# Patient Record
Sex: FEMALE | Race: BLACK OR AFRICAN AMERICAN | Employment: OTHER | URBAN - METROPOLITAN AREA
[De-identification: names, ages, dates, MRNs, and addresses within clinical notes are randomized per-mention and may not be internally consistent; named-entity substitution may affect disease eponyms.]

---

## 2020-03-18 ENCOUNTER — HOSPITAL ENCOUNTER (OUTPATIENT)
Dept: GENERAL RADIOLOGY | Age: 70
End: 2020-03-18
Attending: SURGERY
Payer: MEDICARE

## 2020-03-18 ENCOUNTER — HOSPITAL ENCOUNTER (OUTPATIENT)
Dept: GENERAL RADIOLOGY | Age: 70
Discharge: HOME | End: 2020-03-18
Attending: SURGERY
Payer: MEDICARE

## 2020-03-18 ENCOUNTER — HOSPITAL ENCOUNTER (OUTPATIENT)
Dept: PREADMISSION TESTING | Age: 70
Discharge: HOME | End: 2020-03-18
Payer: MEDICARE

## 2020-03-18 VITALS
HEIGHT: 62 IN | HEART RATE: 71 BPM | RESPIRATION RATE: 18 BRPM | BODY MASS INDEX: 29.89 KG/M2 | WEIGHT: 162.44 LBS | OXYGEN SATURATION: 98 % | DIASTOLIC BLOOD PRESSURE: 89 MMHG | SYSTOLIC BLOOD PRESSURE: 189 MMHG | TEMPERATURE: 98.6 F

## 2020-03-18 LAB
ABO + RH BLD: NORMAL
ANION GAP SERPL CALC-SCNC: 4 MMOL/L (ref 5–15)
ATRIAL RATE: 73 BPM
BASOPHILS # BLD: 0.1 K/UL (ref 0–0.1)
BASOPHILS NFR BLD: 1 % (ref 0–1)
BLOOD GROUP ANTIBODIES SERPL: NORMAL
BUN SERPL-MCNC: 8 MG/DL (ref 6–20)
BUN/CREAT SERPL: 14 (ref 12–20)
CALCIUM SERPL-MCNC: 8.9 MG/DL (ref 8.5–10.1)
CALCULATED P AXIS, ECG09: 62 DEGREES
CALCULATED R AXIS, ECG10: 40 DEGREES
CALCULATED T AXIS, ECG11: 126 DEGREES
CHLORIDE SERPL-SCNC: 105 MMOL/L (ref 97–108)
CO2 SERPL-SCNC: 29 MMOL/L (ref 21–32)
CREAT SERPL-MCNC: 0.58 MG/DL (ref 0.55–1.02)
DIAGNOSIS, 93000: NORMAL
DIFFERENTIAL METHOD BLD: ABNORMAL
EOSINOPHIL # BLD: 0.1 K/UL (ref 0–0.4)
EOSINOPHIL NFR BLD: 1 % (ref 0–7)
ERYTHROCYTE [DISTWIDTH] IN BLOOD BY AUTOMATED COUNT: 18.2 % (ref 11.5–14.5)
GLUCOSE SERPL-MCNC: 156 MG/DL (ref 65–100)
HCT VFR BLD AUTO: 29.3 % (ref 35–47)
HGB BLD-MCNC: 8.1 G/DL (ref 11.5–16)
IMM GRANULOCYTES # BLD AUTO: 0 K/UL (ref 0–0.04)
IMM GRANULOCYTES NFR BLD AUTO: 0 % (ref 0–0.5)
LYMPHOCYTES # BLD: 1.6 K/UL (ref 0.8–3.5)
LYMPHOCYTES NFR BLD: 21 % (ref 12–49)
MCH RBC QN AUTO: 18.8 PG (ref 26–34)
MCHC RBC AUTO-ENTMCNC: 27.6 G/DL (ref 30–36.5)
MCV RBC AUTO: 68 FL (ref 80–99)
MONOCYTES # BLD: 0.6 K/UL (ref 0–1)
MONOCYTES NFR BLD: 8 % (ref 5–13)
NEUTS SEG # BLD: 5.4 K/UL (ref 1.8–8)
NEUTS SEG NFR BLD: 69 % (ref 32–75)
NRBC # BLD: 0 K/UL (ref 0–0.01)
NRBC BLD-RTO: 0 PER 100 WBC
P-R INTERVAL, ECG05: 142 MS
PLATELET # BLD AUTO: 303 K/UL (ref 150–400)
PMV BLD AUTO: 8.7 FL (ref 8.9–12.9)
POTASSIUM SERPL-SCNC: 4 MMOL/L (ref 3.5–5.1)
Q-T INTERVAL, ECG07: 416 MS
QRS DURATION, ECG06: 82 MS
QTC CALCULATION (BEZET), ECG08: 458 MS
RBC # BLD AUTO: 4.31 M/UL (ref 3.8–5.2)
RBC MORPH BLD: ABNORMAL
RBC MORPH BLD: ABNORMAL
SODIUM SERPL-SCNC: 138 MMOL/L (ref 136–145)
SPECIMEN EXP DATE BLD: NORMAL
VENTRICULAR RATE, ECG03: 73 BPM
WBC # BLD AUTO: 7.8 K/UL (ref 3.6–11)

## 2020-03-18 PROCEDURE — 71046 X-RAY EXAM CHEST 2 VIEWS: CPT

## 2020-03-18 PROCEDURE — 80048 BASIC METABOLIC PNL TOTAL CA: CPT

## 2020-03-18 PROCEDURE — 93005 ELECTROCARDIOGRAM TRACING: CPT

## 2020-03-18 PROCEDURE — 86900 BLOOD TYPING SEROLOGIC ABO: CPT

## 2020-03-18 PROCEDURE — 85025 COMPLETE CBC W/AUTO DIFF WBC: CPT

## 2020-03-18 PROCEDURE — 36415 COLL VENOUS BLD VENIPUNCTURE: CPT

## 2020-03-18 RX ORDER — HYDROCHLOROTHIAZIDE 25 MG/1
25 TABLET ORAL DAILY
COMMUNITY

## 2020-03-18 RX ORDER — AMLODIPINE AND BENAZEPRIL HYDROCHLORIDE 5; 20 MG/1; MG/1
1 CAPSULE ORAL DAILY
COMMUNITY

## 2020-03-18 RX ORDER — SITAGLIPTIN AND METFORMIN HYDROCHLORIDE 100; 1000 MG/1; MG/1
1 TABLET, FILM COATED, EXTENDED RELEASE ORAL EVERY EVENING
COMMUNITY

## 2020-03-18 NOTE — PERIOP NOTES
[address] [facility] [address], [address] [facility]   MAIN OR                                  [phone]   MAIN PRE OP                          [phone]                                                                                AMBULATORY PRE OP          [phone]  PRE-ADMISSION TESTING    [phone]   Surgery Date:   3/24/2020        Is surgery arrival time given by surgeon? NO  If NO, [address] staff will call you between 3 and 7pm the day before your surgery with your arrival time. (If your surgery is on a Monday, we will call you the Friday before.)    Call [phone] after 7pm Monday-Friday if you did not receive this call. INSTRUCTIONS BEFORE YOUR SURGERY   When You  Arrive Arrive at the 2nd [address] [facility] on the day of your surgery  Have your insurance card, photo ID, and any copayment (if needed)   Food   and   Drink NO food or drink after midnight the night before surgery    This means NO water, gum, mints, coffee, juice, etc.  No alcohol (beer, wine, liquor) 24 hours before and after surgery   Medications to   TAKE   Morning of Surgery MEDICATIONS TO TAKE THE MORNING OF SURGERY WITH A SIP OF WATER:    N/A   Medications  To  STOP      7 days before surgery  Non-Steroidal anti-inflammatory Drugs (NSAID's): for example, Ibuprofen (Advil, Motrin), Naproxen (Aleve)   Aspirin, if taking for pain    Herbal supplements, vitamins, and fish oil   Other:  (Pain medications not listed above, including Tylenol may be taken)   Blood  Thinners  If you take  Aspirin, Plavix, Coumadin, or any blood-thinning or anti-blood clot medicine, talk to the doctor who prescribed the medications for pre-operative instructions.    Bathing Clothing  Jewelry  Valuables      If you shower the morning of surgery, please do not apply anything to your skin (lotions, powders, deodorant, or makeup, especially mascara)   Follow Chlorhexidine Care Fusion body wash instructions provided to you during PAT appointment. Begin 3 days prior to surgery.  Do not shave or trim anywhere 24 hours before surgery   Wear your hair loose or down; no pony-tails, buns, or metal hair clips   Wear loose, comfortable, clean clothes   Wear glasses instead of contacts   Leave money, valuables, and jewelry, including body piercings, at home   Going Home - or Spending the Night  SAME-DAY SURGERY: You must have a responsible adult drive you home and stay with you 24 hours after surgery   ADMITS: If your doctor is keeping you in the hospital after surgery, leave personal belongings/luggage in your car until you have a hospital room number. Hospital discharge time is 12 noon  Drivers must be here before 12 noon unless you are told differently   Special Instructions Bring completed PTA medication list with you on the day of your surgery. Incentive Spirometer per instructions. Follow all instructions so your surgery wont be cancelled. Please, be on time. If a situation occurs and you are delayed the day of surgery, call [phone]. If your physical condition changes (like a fever, cold, flu, etc.) call your surgeon. Home medication(s) reviewed and verified by bottle during PAT appointment. The patient was contacted  in person. The patient verbalizes understanding of all instructions and does not  need reinforcement.

## 2020-05-19 ENCOUNTER — HOSPITAL ENCOUNTER (OUTPATIENT)
Dept: PREADMISSION TESTING | Age: 70
Discharge: HOME | End: 2020-05-19
Payer: MEDICARE

## 2020-05-19 VITALS
HEIGHT: 62 IN | RESPIRATION RATE: 18 BRPM | DIASTOLIC BLOOD PRESSURE: 79 MMHG | SYSTOLIC BLOOD PRESSURE: 163 MMHG | TEMPERATURE: 97.6 F | OXYGEN SATURATION: 98 % | HEART RATE: 86 BPM | BODY MASS INDEX: 30.73 KG/M2 | WEIGHT: 167 LBS

## 2020-05-19 LAB
ABO + RH BLD: NORMAL
ANION GAP SERPL CALC-SCNC: 6 MMOL/L (ref 5–15)
BASOPHILS # BLD: 0.1 K/UL (ref 0–0.1)
BASOPHILS NFR BLD: 1 % (ref 0–1)
BLOOD GROUP ANTIBODIES SERPL: NORMAL
BUN SERPL-MCNC: 16 MG/DL (ref 6–20)
BUN/CREAT SERPL: 22 (ref 12–20)
CALCIUM SERPL-MCNC: 9 MG/DL (ref 8.5–10.1)
CHLORIDE SERPL-SCNC: 103 MMOL/L (ref 97–108)
CO2 SERPL-SCNC: 27 MMOL/L (ref 21–32)
CREAT SERPL-MCNC: 0.72 MG/DL (ref 0.55–1.02)
DIFFERENTIAL METHOD BLD: ABNORMAL
EOSINOPHIL # BLD: 0.1 K/UL (ref 0–0.4)
EOSINOPHIL NFR BLD: 1 % (ref 0–7)
ERYTHROCYTE [DISTWIDTH] IN BLOOD BY AUTOMATED COUNT: 20.6 % (ref 11.5–14.5)
GLUCOSE SERPL-MCNC: 262 MG/DL (ref 65–100)
HCT VFR BLD AUTO: 31.5 % (ref 35–47)
HGB BLD-MCNC: 8.9 G/DL (ref 11.5–16)
IMM GRANULOCYTES # BLD AUTO: 0 K/UL (ref 0–0.04)
IMM GRANULOCYTES NFR BLD AUTO: 0 % (ref 0–0.5)
LYMPHOCYTES # BLD: 1.3 K/UL (ref 0.8–3.5)
LYMPHOCYTES NFR BLD: 22 % (ref 12–49)
MCH RBC QN AUTO: 19.8 PG (ref 26–34)
MCHC RBC AUTO-ENTMCNC: 28.3 G/DL (ref 30–36.5)
MCV RBC AUTO: 70 FL (ref 80–99)
MONOCYTES # BLD: 0.5 K/UL (ref 0–1)
MONOCYTES NFR BLD: 9 % (ref 5–13)
NEUTS SEG # BLD: 3.9 K/UL (ref 1.8–8)
NEUTS SEG NFR BLD: 67 % (ref 32–75)
NRBC # BLD: 0 K/UL (ref 0–0.01)
NRBC BLD-RTO: 0 PER 100 WBC
PLATELET # BLD AUTO: 379 K/UL (ref 150–400)
PMV BLD AUTO: 9.3 FL (ref 8.9–12.9)
POTASSIUM SERPL-SCNC: 4 MMOL/L (ref 3.5–5.1)
RBC # BLD AUTO: 4.5 M/UL (ref 3.8–5.2)
RBC MORPH BLD: ABNORMAL
SODIUM SERPL-SCNC: 136 MMOL/L (ref 136–145)
SPECIMEN EXP DATE BLD: NORMAL
WBC # BLD AUTO: 5.9 K/UL (ref 3.6–11)

## 2020-05-19 PROCEDURE — 36415 COLL VENOUS BLD VENIPUNCTURE: CPT

## 2020-05-19 PROCEDURE — 86900 BLOOD TYPING SEROLOGIC ABO: CPT

## 2020-05-19 PROCEDURE — 80048 BASIC METABOLIC PNL TOTAL CA: CPT

## 2020-05-19 PROCEDURE — 85025 COMPLETE CBC W/AUTO DIFF WBC: CPT

## 2020-05-19 NOTE — PERIOP NOTES
Anesthesia, , states for pt to take amlodipine/Benazepril combo DOS. Passed info along to pt's PAT RN.

## 2020-05-19 NOTE — H&P
Preoperative Evaluation                     History and Physical with Surgical Risk Stratification     5/19/2020    CC: Hernia  Surgery: Hiatal hernia repair with endoscopy     HPI:   Mo Lenz is a 78 y.o. female referred for pre-operative evaluation by Dr. Ion Myrick for surgery on 5/26/20. Mrs. Enio Gonzalez was here in March for her surgery but it had to be postponed r/t COVID-19. She was referred to the surgeon by her GI doctor earlier this year r/t her hernia. It has caused an increase in dyspepsia. Denies trouble swallowing. Denies abdominal pain. The patient was evaluated in the surgeon's office and it was determined that the most appropriate plan of care is to proceed with surgical intervention. Patient's PCP Chari Rg MD    Review of Systems     Constitutional: Negative for chills and fever  HENT: Negative for congestion and sore throat  Eyes: negative for blurred vision and double vision  Respiratory: Negative for cough, shortness of breath and wheezing  Mouth: Negative for loose, broken or chipped teeth. Cardiovascular: Negative for chest pain and palpitations  Gastrointestinal: Negative for abdominal pain, constipation, diarrhea and nausea  Genitourinary: Negative for dysuria and hematuria  Musculoskeletal:Negative for joint pain  Skin: Negative for rash, open wounds. Negative for bruises easily  Neurological: Negative for dizziness, tremors and headaches  Psychiatric: Negative for depression. The patient is not nervous/anxious.     Inherent Risk of Surgery     Surgical risk:  Intermediate  Low:  EIntermediate:   abdominal,High:    Patient Cardiac Risk Assessment     Revised Cardiac Risk Index (RCRI)    Rate if cardiac death, nonfatal MI, nonfatal cardiac arrest by number of risk factor- 0.4%    TORSTEN/AHA 2007 Guidelines:   1) Surgery Emergency, Non-cardiac -> to surgery  2) If not, look at clinical predictors    Major Intermediate Minor   Abnormal EKGDiabetes    Blood Thinner: NA    METS      EQUAL TO 4 Care for self Walk indoors around house Walk 2-3 blocks on level ground (2-3 mph) Light work around house (dust, dishes)     Other Risk Factors:   Screening for ETOH use:  Done and low risk  Smoking status:       Personal or FH of bleeding problems:  No  Personal or FH of blood clots:  No  Personal or FH of anesthesia problems:   No    Pulmonary Risk:  Asthma or COPD:  No  Body mass index is 30.54 kg/m². Known OBDULIO:  No  Albumin normal, BUN normal    Past Medical, Surgical, Social History     Allergies: No Known Allergies    Medication Documentation Review Audit     Reviewed by Maria D Krishna RN (Registered Nurse) on 05/19/20 at 26    Medication Sig Documenting Provider Last Dose Status Taking? amLODIPine-benazepril (LOTREL) 5-20 mg per capsule Take 1 Cap by mouth daily. Provider, Historical  Active Yes   cyanocobalamin, vitamin B-12, (VITAMIN B-12 PO) Take 1 Tab by mouth daily. Provider, Historical  Active    hydroCHLOROthiazide (HYDRODIURIL) 25 mg tablet Take 25 mg by mouth daily. Provider, Historical  Active Yes   SITagliptin-metFORMIN (Janumet XR) 100-1,000 mg TM24 Take 1 Tab by mouth every evening.  Provider, Historical  Active Yes                Past Medical History:   Diagnosis Date    Anemia 05/19/2020    8.9    Diabetes ([address]     GERD (gastroesophageal reflux disease)     Hypertension     Vertigo 02/2020    White coat syndrome with hypertension      Past Surgical History:   Procedure Laterality Date    HX APPENDECTOMY      HX COLONOSCOPY  05/2019    HX HERNIA REPAIR      HX LITHOTRIPSY  02/2020    HX OOPHORECTOMY Right      Social History     Tobacco Use    Smoking status: Never Smoker    Smokeless tobacco: Never Used   Substance Use Topics    Alcohol use: Never     Frequency: Never    Drug use: Never     Family History   Problem Relation Age of Onset    Deep Vein Thrombosis Neg Hx     Anesth Problems Neg Hx        Objective     Vitals:    05/19/20 1015   BP: 163/79   Pulse: 86   Resp: 18   Temp: 97.6 °F (36.4 °C)   SpO2: 98%   Weight: 75.8 kg (167 lb)   Height: 5' 2\" (1.575 m)       Constitutional:  Appears well,  No Acute Distress, Vitals noted  Psychiatric:   Affect normal, Alert and Oriented to person/place/time    Eyes:   Pupils equally round and reactive, EOMI, conjunctiva clear, eyelids normal  ENT:   External ears and nose normal, teeth normal, gums normal, TMs and Orophyarynx normal  Neck:   General inspection and Thyroid normal.  No abnormal cervical or supraclavicular nodes    Lungs:   Clear to auscultation, good respiratory effort  Heart: Ausculation normal.  Regular rhythm. No cardiac murmurs.   No carotid bruits or palpable thrills  Chest wall normal  Musculoskeletal: Gait normal  Extremities:   Without edema, good peripheral pulses  Skin:   Warm to palpation, without rashes, bruising, or suspicious lesions     Recent Results (from the past 72 hour(s))   METABOLIC PANEL, BASIC    Collection Time: 05/19/20 10:56 AM   Result Value Ref Range    Sodium 136 136 - 145 mmol/L    Potassium 4.0 3.5 - 5.1 mmol/L    Chloride 103 97 - 108 mmol/L    CO2 27 21 - 32 mmol/L    Anion gap 6 5 - 15 mmol/L    Glucose 262 (H) 65 - 100 mg/dL    BUN 16 6 - 20 MG/DL    Creatinine 0.72 0.55 - 1.02 MG/DL    BUN/Creatinine ratio 22 (H) 12 - 20      GFR est AA >60 >60 ml/min/1.73m2    GFR est non-AA >60 >60 ml/min/1.73m2    Calcium 9.0 8.5 - 10.1 MG/DL   CBC WITH AUTOMATED DIFF    Collection Time: 05/19/20 10:56 AM   Result Value Ref Range    WBC 5.9 3.6 - 11.0 K/uL    RBC 4.50 3.80 - 5.20 M/uL    HGB 8.9 (L) 11.5 - 16.0 g/dL    HCT 31.5 (L) 35.0 - 47.0 %    MCV 70.0 (L) 80.0 - 99.0 FL    MCH 19.8 (L) 26.0 - 34.0 PG    MCHC 28.3 (L) 30.0 - 36.5 g/dL    RDW 20.6 (H) 11.5 - 14.5 %    PLATELET 031 763 - 491 K/uL    MPV 9.3 8.9 - 12.9 FL    NRBC 0.0 0  WBC    ABSOLUTE NRBC 0.00 0.00 - 0.01 K/uL    NEUTROPHILS 67 32 - 75 %    LYMPHOCYTES 22 12 - 49 %    MONOCYTES 9 5 - 13 % EOSINOPHILS 1 0 - 7 %    BASOPHILS 1 0 - 1 %    IMMATURE GRANULOCYTES 0 0.0 - 0.5 %    ABS. NEUTROPHILS 3.9 1.8 - 8.0 K/UL    ABS. LYMPHOCYTES 1.3 0.8 - 3.5 K/UL    ABS. MONOCYTES 0.5 0.0 - 1.0 K/UL    ABS. EOSINOPHILS 0.1 0.0 - 0.4 K/UL    ABS. BASOPHILS 0.1 0.0 - 0.1 K/UL    ABS. IMM. GRANS. 0.0 0.00 - 0.04 K/UL    DF SMEAR SCANNED      RBC COMMENTS HYPOCHROMIA  1+       CULTURE, MRSA    Collection Time: 05/19/20 10:56 AM   Result Value Ref Range    Special Requests: NO SPECIAL REQUESTS      Culture result: MRSA NOT PRESENT  AT 17 HOURS     TYPE & SCREEN    Collection Time: 05/19/20 10:56 AM   Result Value Ref Range    Crossmatch Expiration 05/29/2020     ABO/Rh(D) O POSITIVE     Antibody screen NEG        Assessment and Plan     Assessment/Plan:   1) Hernia  2) Pre-Operative Evaluation    Labs reviewed. EKG and CXR reviewed from March. MRSA pending    CBC sent to surgeon r/t anemia. Preoperative Clearance  Per RCRI, the patient has a 0.4% risk of cardiac death, nonfatal MI, nonfatal cardiac arrest based on no risk factors. Per ACC/AHA guidelines, patient is intermediate risk for a(n) intermediate risk surgery and may proceed to planned surgery with the above noted risk.     Fanta Mai NP

## 2020-05-19 NOTE — PERIOP NOTES
It is now mandated that all surgical patients be tested for COVID-19 prior to surgery. Testing has to be exactly 4 days prior to surgery. Your COVID test date is Friday, May 22nd between 8:00 am and 11:00 am.       COVID testing will be performed curbside at the [address] [place] [address] kath. There will be signs leading you to the testing site. You will need to bring a photo ID with you to be swabbed. Patients are advised to self-quarantine at home after testing and prior to your surgery date. You will be notified if your results are positive. What to watch for:   Coronavirus (COVID-19) affects different people in different ways   It also appears with a wide range of symptoms from mild to severe   Signs usually appear 2-14 days after exposure     If you develop any of the following, notify your doctor immediately:  o Fever  o Chills, with or without a shiver  o Muscle pain  o Headache  o Sore throat  o Dry cough  o New loss of taste or smell  o Tiredness      If you develop any of the following, call 911:  o Shortness of breath  o Difficulty breathing  o Chest pain  o New confusion  Blueness of fingers and/or lips  [address] [facility]                                  [phone]   MAIN PRE OP                          [phone]                                                                                AMBULATORY PRE OP          [phone]  PRE-ADMISSION TESTING    [phone]       Surgery Date: Tuesday, May 26th       Is surgery arrival time given by surgeon? NO  If NO, [facility] staff will call you between 3 and 7pm the day before your surgery with your arrival time. (If your surgery is on a Monday, we will call you the Friday before.)    Call [phone] after 7pm Monday-Friday if you did not receive this call.     INSTRUCTIONS BEFORE YOUR SURGERY   When You  Arrive Arrive at the 2nd Floor Admitting Desk on the day of your surgery  Have your insurance card, photo ID, and any copayment (if needed)   Food   and   Drink NO food or drink after midnight the night before surgery    This means NO water, gum, mints, coffee, juice, etc.  No alcohol (beer, wine, liquor) 24 hours before and after surgery   Medications to   TAKE   Morning of Surgery MEDICATIONS TO TAKE THE MORNING OF SURGERY WITH A SIP OF WATER:   LOTREL:(AMLODIPINE-BENAZEPRIL)   Medications  To  STOP      7 days before surgery Non-Steroidal anti-inflammatory Drugs (NSAID's): for example, Ibuprofen (Advil, Motrin), Naproxen (Aleve)  Aspirin, if taking for pain   Herbal supplements, vitamins, and fish oil  Other:  (Pain medications not listed above, including Tylenol may be taken)       Bathing Clothing  Jewelry  Valuables     If you shower the morning of surgery, please do not apply anything to your skin (lotions, powders, deodorant, or makeup, especially mascara)  Follow Chlorhexidine Care Fusion body wash instructions provided to you during PAT appointment. Begin 3 days prior to surgery. Do not shave or trim anywhere 24 hours before surgery  Wear your hair loose or down; no pony-tails, buns, or metal hair clips  Wear loose, comfortable, clean clothes  Wear glasses instead of contacts  Leave money, valuables, and jewelry, including body piercings, at home   [address] [place] [address] - or Spending the [facility] discharge time is 12 noon  Drivers must be here before 12 noon unless you are told differently   Special Instructions COVID Precautions     Follow all instructions so your surgery wont be cancelled. Please, be on time. If a situation occurs and you are delayed the day of surgery, call [phone] or [phone]. If your physical condition changes (like a fever, cold, flu, etc.) call your surgeon. Home medication(s) reviewed and verified BOTTLES during PAT appointment.     The patient was contacted  in person. The patient verbalizes understanding of all instructions and does not  need reinforcement.   o

## 2020-05-20 LAB
BACTERIA SPEC CULT: NORMAL
BACTERIA SPEC CULT: NORMAL
SERVICE CMNT-IMP: NORMAL

## 2020-05-22 ENCOUNTER — ANESTHESIA EVENT (OUTPATIENT)
Dept: SURGERY | Age: 70
DRG: 328 | End: 2020-05-22
Payer: MEDICARE

## 2020-05-22 ENCOUNTER — HOSPITAL ENCOUNTER (OUTPATIENT)
Dept: PREADMISSION TESTING | Age: 70
Discharge: HOME | End: 2020-05-22
Payer: MEDICARE

## 2020-05-22 PROCEDURE — 87635 SARS-COV-2 COVID-19 AMP PRB: CPT

## 2020-05-23 LAB — SARS-COV-2, COV2NT: NOT DETECTED

## 2020-05-23 NOTE — ANESTHESIA PREPROCEDURE EVALUATION
Relevant Problems   No relevant active problems       Anesthetic History   No history of anesthetic complications            Review of Systems / Medical History  Patient summary reviewed, nursing notes reviewed and pertinent labs reviewed    Pulmonary  Within defined limits                 Neuro/Psych   Within defined limits           Cardiovascular    Hypertension                   GI/Hepatic/Renal     GERD           Endo/Other    Diabetes: type 2         Other Findings                 Anesthetic Plan    ASA: 2  Anesthesia type: general          Induction: Intravenous  Anesthetic plan and risks discussed with: Patient

## 2020-05-26 ENCOUNTER — HOSPITAL ENCOUNTER (INPATIENT)
Age: 70
LOS: 1 days | Discharge: HOME | DRG: 328 | End: 2020-05-27
Attending: SURGERY | Admitting: SURGERY
Payer: MEDICARE

## 2020-05-26 ENCOUNTER — ANESTHESIA (OUTPATIENT)
Dept: SURGERY | Age: 70
DRG: 328 | End: 2020-05-26
Payer: MEDICARE

## 2020-05-26 PROBLEM — K44.0 DIAPHRAGMATIC HERNIA WITH OBSTRUCTION: Status: ACTIVE | Noted: 2020-05-26

## 2020-05-26 LAB
GLUCOSE BLD STRIP.AUTO-MCNC: 170 MG/DL (ref 65–100)
GLUCOSE BLD STRIP.AUTO-MCNC: 277 MG/DL (ref 65–100)
SERVICE CMNT-IMP: ABNORMAL
SERVICE CMNT-IMP: ABNORMAL

## 2020-05-26 PROCEDURE — 77030040361 HC SLV COMPR DVT MDII -B

## 2020-05-26 PROCEDURE — 76060000037 HC ANESTHESIA 3 TO 3.5 HR: Performed by: SURGERY

## 2020-05-26 PROCEDURE — 74011250636 HC RX REV CODE- 250/636: Performed by: SURGERY

## 2020-05-26 PROCEDURE — 77030018836 HC SOL IRR NACL ICUM -A: Performed by: SURGERY

## 2020-05-26 PROCEDURE — 77030002933 HC SUT MCRYL J&J -A: Performed by: SURGERY

## 2020-05-26 PROCEDURE — 77030031139 HC SUT VCRL2 J&J -A: Performed by: SURGERY

## 2020-05-26 PROCEDURE — 77030010507 HC ADH SKN DERMBND J&J -B: Performed by: SURGERY

## 2020-05-26 PROCEDURE — 74011250637 HC RX REV CODE- 250/637: Performed by: SURGERY

## 2020-05-26 PROCEDURE — 77030020703 HC SEAL CANN DISP INTU -B: Performed by: SURGERY

## 2020-05-26 PROCEDURE — 77030031492 HC PRT ACC BLNT AIRSEAL CNMD -B: Performed by: SURGERY

## 2020-05-26 PROCEDURE — 77030040922 HC BLNKT HYPOTHRM STRY -A

## 2020-05-26 PROCEDURE — 77030035489 HC REDUCR CANN ENDOWR INTU -C: Performed by: SURGERY

## 2020-05-26 PROCEDURE — 77030002912 HC SUT ETHBND J&J -A: Performed by: SURGERY

## 2020-05-26 PROCEDURE — 77030040506 HC DRN WND MDII -A: Performed by: SURGERY

## 2020-05-26 PROCEDURE — 77030035277 HC OBTRTR BLDELSS DISP INTU -B: Performed by: SURGERY

## 2020-05-26 PROCEDURE — 77030035279 HC SEAL VSL ENDOWR XI INTU -I2: Performed by: SURGERY

## 2020-05-26 PROCEDURE — 8E0W4CZ ROBOTIC ASSISTED PROCEDURE OF TRUNK REGION, PERCUTANEOUS ENDOSCOPIC APPROACH: ICD-10-PCS | Performed by: SURGERY

## 2020-05-26 PROCEDURE — 77030008684 HC TU ET CUF COVD -B: Performed by: ANESTHESIOLOGY

## 2020-05-26 PROCEDURE — 74011000250 HC RX REV CODE- 250: Performed by: SURGERY

## 2020-05-26 PROCEDURE — 65270000029 HC RM PRIVATE

## 2020-05-26 PROCEDURE — 77030008771 HC TU NG SALEM SUMP -A: Performed by: ANESTHESIOLOGY

## 2020-05-26 PROCEDURE — 77030013079 HC BLNKT BAIR HGGR 3M -A: Performed by: ANESTHESIOLOGY

## 2020-05-26 PROCEDURE — 76210000017 HC OR PH I REC 1.5 TO 2 HR: Performed by: SURGERY

## 2020-05-26 PROCEDURE — 77030002895 HC DEV VASC CLOSR COVD -B: Performed by: SURGERY

## 2020-05-26 PROCEDURE — 74011250636 HC RX REV CODE- 250/636: Performed by: ANESTHESIOLOGY

## 2020-05-26 PROCEDURE — 77030016151 HC PROTCTR LNS DFOG COVD -B: Performed by: SURGERY

## 2020-05-26 PROCEDURE — 77030040830 HC CATH URETH FOL MDII -A: Performed by: SURGERY

## 2020-05-26 PROCEDURE — 77030037032 HC INSRT SCIS CLICKLLINE DISP STOR -B: Performed by: SURGERY

## 2020-05-26 PROCEDURE — 76010000878 HC OR TIME 3 TO 3.5HR INTENSV - TIER 2: Performed by: SURGERY

## 2020-05-26 PROCEDURE — 82962 GLUCOSE BLOOD TEST: CPT

## 2020-05-26 PROCEDURE — 74011250636 HC RX REV CODE- 250/636: Performed by: NURSE ANESTHETIST, CERTIFIED REGISTERED

## 2020-05-26 PROCEDURE — 77030026438 HC STYL ET INTUB CARD -A: Performed by: ANESTHESIOLOGY

## 2020-05-26 PROCEDURE — 74011636637 HC RX REV CODE- 636/637: Performed by: ANESTHESIOLOGY

## 2020-05-26 PROCEDURE — 0BUT4JZ SUPPLEMENT DIAPHRAGM WITH SYNTHETIC SUBSTITUTE, PERCUTANEOUS ENDOSCOPIC APPROACH: ICD-10-PCS | Performed by: SURGERY

## 2020-05-26 PROCEDURE — C1781 MESH (IMPLANTABLE): HCPCS | Performed by: SURGERY

## 2020-05-26 PROCEDURE — 77030035278 HC STPLR SEAL ENDOWR INTU -B: Performed by: SURGERY

## 2020-05-26 PROCEDURE — 77030008756 HC TU IRR SUC STRY -B: Performed by: SURGERY

## 2020-05-26 PROCEDURE — 77030002973 HC SUT PLEDG CV SFT OVL TELE -B: Performed by: SURGERY

## 2020-05-26 PROCEDURE — 0DJ08ZZ INSPECTION OF UPPER INTESTINAL TRACT, VIA NATURAL OR ARTIFICIAL OPENING ENDOSCOPIC: ICD-10-PCS | Performed by: SURGERY

## 2020-05-26 PROCEDURE — 77030011640 HC PAD GRND REM COVD -A: Performed by: SURGERY

## 2020-05-26 PROCEDURE — 74011000250 HC RX REV CODE- 250: Performed by: NURSE ANESTHETIST, CERTIFIED REGISTERED

## 2020-05-26 DEVICE — PHASIX ST MESH, 7 CM X 10 CM (3" X 4"), RECTANGLE
Type: IMPLANTABLE DEVICE | Site: ESOPHAGUS | Status: FUNCTIONAL
Brand: PHASIX

## 2020-05-26 RX ORDER — ALOGLIPTIN 25 MG/1
25 TABLET, FILM COATED ORAL DAILY
Status: DISCONTINUED | OUTPATIENT
Start: 2020-05-27 | End: 2020-05-27 | Stop reason: HOSPADM

## 2020-05-26 RX ORDER — ACETAMINOPHEN 500 MG
1000 TABLET ORAL
Status: COMPLETED | OUTPATIENT
Start: 2020-05-26 | End: 2020-05-26

## 2020-05-26 RX ORDER — EPHEDRINE SULFATE/0.9% NACL/PF 50 MG/5 ML
SYRINGE (ML) INTRAVENOUS AS NEEDED
Status: DISCONTINUED | OUTPATIENT
Start: 2020-05-26 | End: 2020-05-26 | Stop reason: HOSPADM

## 2020-05-26 RX ORDER — GABAPENTIN 300 MG/1
300 CAPSULE ORAL 3 TIMES DAILY
Status: DISCONTINUED | OUTPATIENT
Start: 2020-05-26 | End: 2020-05-27 | Stop reason: HOSPADM

## 2020-05-26 RX ORDER — LISINOPRIL 20 MG/1
20 TABLET ORAL DAILY
Status: DISCONTINUED | OUTPATIENT
Start: 2020-05-27 | End: 2020-05-27 | Stop reason: HOSPADM

## 2020-05-26 RX ORDER — GABAPENTIN 300 MG/1
300 CAPSULE ORAL
Status: COMPLETED | OUTPATIENT
Start: 2020-05-26 | End: 2020-05-26

## 2020-05-26 RX ORDER — SODIUM CHLORIDE, SODIUM LACTATE, POTASSIUM CHLORIDE, CALCIUM CHLORIDE 600; 310; 30; 20 MG/100ML; MG/100ML; MG/100ML; MG/100ML
100 INJECTION, SOLUTION INTRAVENOUS CONTINUOUS
Status: DISCONTINUED | OUTPATIENT
Start: 2020-05-26 | End: 2020-05-26 | Stop reason: HOSPADM

## 2020-05-26 RX ORDER — GLYCOPYRROLATE 0.2 MG/ML
INJECTION INTRAMUSCULAR; INTRAVENOUS AS NEEDED
Status: DISCONTINUED | OUTPATIENT
Start: 2020-05-26 | End: 2020-05-26 | Stop reason: HOSPADM

## 2020-05-26 RX ORDER — AMLODIPINE BESYLATE 5 MG/1
5 TABLET ORAL DAILY
Status: DISCONTINUED | OUTPATIENT
Start: 2020-05-27 | End: 2020-05-27 | Stop reason: HOSPADM

## 2020-05-26 RX ORDER — METFORMIN HYDROCHLORIDE 500 MG/1
1000 TABLET, EXTENDED RELEASE ORAL
Status: DISCONTINUED | OUTPATIENT
Start: 2020-05-26 | End: 2020-05-27 | Stop reason: HOSPADM

## 2020-05-26 RX ORDER — SUCCINYLCHOLINE CHLORIDE 20 MG/ML
INJECTION INTRAMUSCULAR; INTRAVENOUS AS NEEDED
Status: DISCONTINUED | OUTPATIENT
Start: 2020-05-26 | End: 2020-05-26 | Stop reason: HOSPADM

## 2020-05-26 RX ORDER — SODIUM CHLORIDE, SODIUM LACTATE, POTASSIUM CHLORIDE, CALCIUM CHLORIDE 600; 310; 30; 20 MG/100ML; MG/100ML; MG/100ML; MG/100ML
INJECTION, SOLUTION INTRAVENOUS
Status: DISCONTINUED | OUTPATIENT
Start: 2020-05-26 | End: 2020-05-26 | Stop reason: HOSPADM

## 2020-05-26 RX ORDER — DEXAMETHASONE SODIUM PHOSPHATE 4 MG/ML
INJECTION, SOLUTION INTRA-ARTICULAR; INTRALESIONAL; INTRAMUSCULAR; INTRAVENOUS; SOFT TISSUE AS NEEDED
Status: DISCONTINUED | OUTPATIENT
Start: 2020-05-26 | End: 2020-05-26 | Stop reason: HOSPADM

## 2020-05-26 RX ORDER — NEOSTIGMINE METHYLSULFATE 1 MG/ML
INJECTION, SOLUTION INTRAVENOUS AS NEEDED
Status: DISCONTINUED | OUTPATIENT
Start: 2020-05-26 | End: 2020-05-26 | Stop reason: HOSPADM

## 2020-05-26 RX ORDER — HYDROMORPHONE HYDROCHLORIDE 1 MG/ML
.25-1 INJECTION, SOLUTION INTRAMUSCULAR; INTRAVENOUS; SUBCUTANEOUS
Status: DISCONTINUED | OUTPATIENT
Start: 2020-05-26 | End: 2020-05-26 | Stop reason: HOSPADM

## 2020-05-26 RX ORDER — ROCURONIUM BROMIDE 10 MG/ML
INJECTION, SOLUTION INTRAVENOUS AS NEEDED
Status: DISCONTINUED | OUTPATIENT
Start: 2020-05-26 | End: 2020-05-26 | Stop reason: HOSPADM

## 2020-05-26 RX ORDER — LIDOCAINE HYDROCHLORIDE 10 MG/ML
0.1 INJECTION, SOLUTION EPIDURAL; INFILTRATION; INTRACAUDAL; PERINEURAL AS NEEDED
Status: DISCONTINUED | OUTPATIENT
Start: 2020-05-26 | End: 2020-05-26 | Stop reason: HOSPADM

## 2020-05-26 RX ORDER — HYDROCHLOROTHIAZIDE 25 MG/1
25 TABLET ORAL DAILY
Status: DISCONTINUED | OUTPATIENT
Start: 2020-05-27 | End: 2020-05-27 | Stop reason: HOSPADM

## 2020-05-26 RX ORDER — BUPIVACAINE HYDROCHLORIDE 2.5 MG/ML
INJECTION, SOLUTION EPIDURAL; INFILTRATION; INTRACAUDAL AS NEEDED
Status: DISCONTINUED | OUTPATIENT
Start: 2020-05-26 | End: 2020-05-26 | Stop reason: HOSPADM

## 2020-05-26 RX ORDER — LIDOCAINE HYDROCHLORIDE 20 MG/ML
INJECTION, SOLUTION EPIDURAL; INFILTRATION; INTRACAUDAL; PERINEURAL AS NEEDED
Status: DISCONTINUED | OUTPATIENT
Start: 2020-05-26 | End: 2020-05-26 | Stop reason: HOSPADM

## 2020-05-26 RX ORDER — ACETAMINOPHEN 500 MG
500 TABLET ORAL EVERY 8 HOURS
Status: DISCONTINUED | OUTPATIENT
Start: 2020-05-26 | End: 2020-05-27 | Stop reason: HOSPADM

## 2020-05-26 RX ORDER — PROPOFOL 10 MG/ML
INJECTION, EMULSION INTRAVENOUS AS NEEDED
Status: DISCONTINUED | OUTPATIENT
Start: 2020-05-26 | End: 2020-05-26 | Stop reason: HOSPADM

## 2020-05-26 RX ORDER — FENTANYL CITRATE 50 UG/ML
INJECTION, SOLUTION INTRAMUSCULAR; INTRAVENOUS AS NEEDED
Status: DISCONTINUED | OUTPATIENT
Start: 2020-05-26 | End: 2020-05-26 | Stop reason: HOSPADM

## 2020-05-26 RX ORDER — MIDAZOLAM HYDROCHLORIDE 1 MG/ML
INJECTION, SOLUTION INTRAMUSCULAR; INTRAVENOUS AS NEEDED
Status: DISCONTINUED | OUTPATIENT
Start: 2020-05-26 | End: 2020-05-26 | Stop reason: HOSPADM

## 2020-05-26 RX ORDER — OXYCODONE HYDROCHLORIDE 5 MG/1
5 TABLET ORAL
Status: DISCONTINUED | OUTPATIENT
Start: 2020-05-26 | End: 2020-05-27 | Stop reason: HOSPADM

## 2020-05-26 RX ORDER — SODIUM CHLORIDE 9 MG/ML
50 INJECTION, SOLUTION INTRAVENOUS CONTINUOUS
Status: DISCONTINUED | OUTPATIENT
Start: 2020-05-26 | End: 2020-05-27 | Stop reason: HOSPADM

## 2020-05-26 RX ORDER — MORPHINE SULFATE 2 MG/ML
2 INJECTION, SOLUTION INTRAMUSCULAR; INTRAVENOUS
Status: DISCONTINUED | OUTPATIENT
Start: 2020-05-26 | End: 2020-05-27 | Stop reason: HOSPADM

## 2020-05-26 RX ORDER — IBUPROFEN 400 MG/1
400 TABLET ORAL
Status: DISCONTINUED | OUTPATIENT
Start: 2020-05-26 | End: 2020-05-27 | Stop reason: HOSPADM

## 2020-05-26 RX ORDER — CELECOXIB 100 MG/1
200 CAPSULE ORAL
Status: COMPLETED | OUTPATIENT
Start: 2020-05-26 | End: 2020-05-26

## 2020-05-26 RX ADMIN — MIDAZOLAM HYDROCHLORIDE 2 MG: 2 INJECTION, SOLUTION INTRAMUSCULAR; INTRAVENOUS at 07:32

## 2020-05-26 RX ADMIN — PHENYLEPHRINE HYDROCHLORIDE 40 MCG: 10 INJECTION INTRAVENOUS at 08:44

## 2020-05-26 RX ADMIN — PHENYLEPHRINE HYDROCHLORIDE 40 MCG: 10 INJECTION INTRAVENOUS at 09:25

## 2020-05-26 RX ADMIN — Medication 3 MG: at 10:16

## 2020-05-26 RX ADMIN — ROCURONIUM BROMIDE 20 MG: 50 INJECTION, SOLUTION INTRAVENOUS at 07:46

## 2020-05-26 RX ADMIN — FENTANYL CITRATE 50 MCG: 50 INJECTION, SOLUTION INTRAMUSCULAR; INTRAVENOUS at 09:19

## 2020-05-26 RX ADMIN — PHENYLEPHRINE HYDROCHLORIDE 40 MCG: 10 INJECTION INTRAVENOUS at 08:47

## 2020-05-26 RX ADMIN — SUCCINYLCHOLINE CHLORIDE 100 MG: 20 INJECTION, SOLUTION INTRAMUSCULAR; INTRAVENOUS; PARENTERAL at 07:41

## 2020-05-26 RX ADMIN — FENTANYL CITRATE 25 MCG: 50 INJECTION, SOLUTION INTRAMUSCULAR; INTRAVENOUS at 10:23

## 2020-05-26 RX ADMIN — DEXAMETHASONE SODIUM PHOSPHATE 4 MG: 4 INJECTION, SOLUTION INTRAMUSCULAR; INTRAVENOUS at 07:55

## 2020-05-26 RX ADMIN — GABAPENTIN 300 MG: 300 CAPSULE ORAL at 06:32

## 2020-05-26 RX ADMIN — SODIUM CHLORIDE 50 ML/HR: 900 INJECTION, SOLUTION INTRAVENOUS at 10:58

## 2020-05-26 RX ADMIN — PHENYLEPHRINE HYDROCHLORIDE 40 MCG: 10 INJECTION INTRAVENOUS at 09:59

## 2020-05-26 RX ADMIN — LIDOCAINE HYDROCHLORIDE 80 MG: 20 INJECTION, SOLUTION INTRAVENOUS at 07:40

## 2020-05-26 RX ADMIN — Medication 10 MG: at 08:01

## 2020-05-26 RX ADMIN — CELECOXIB 200 MG: 100 CAPSULE ORAL at 06:32

## 2020-05-26 RX ADMIN — Medication 5 MG: at 09:22

## 2020-05-26 RX ADMIN — GLYCOPYRROLATE 0.5 MG: 0.2 INJECTION, SOLUTION INTRAMUSCULAR; INTRAVENOUS at 10:16

## 2020-05-26 RX ADMIN — PROPOFOL 150 MG: 10 INJECTION, EMULSION INTRAVENOUS at 07:41

## 2020-05-26 RX ADMIN — ROCURONIUM BROMIDE 10 MG: 50 INJECTION, SOLUTION INTRAVENOUS at 08:55

## 2020-05-26 RX ADMIN — ROCURONIUM BROMIDE 10 MG: 50 INJECTION, SOLUTION INTRAVENOUS at 09:20

## 2020-05-26 RX ADMIN — HYDROMORPHONE HYDROCHLORIDE 0.5 MG: 1 INJECTION, SOLUTION INTRAMUSCULAR; INTRAVENOUS; SUBCUTANEOUS at 11:52

## 2020-05-26 RX ADMIN — HYDROMORPHONE HYDROCHLORIDE 0.5 MG: 1 INJECTION, SOLUTION INTRAMUSCULAR; INTRAVENOUS; SUBCUTANEOUS at 11:01

## 2020-05-26 RX ADMIN — HYDROMORPHONE HYDROCHLORIDE 0.5 MG: 1 INJECTION, SOLUTION INTRAMUSCULAR; INTRAVENOUS; SUBCUTANEOUS at 11:23

## 2020-05-26 RX ADMIN — ACETAMINOPHEN 1000 MG: 500 TABLET ORAL at 06:32

## 2020-05-26 RX ADMIN — PROPOFOL 20 MG: 10 INJECTION, EMULSION INTRAVENOUS at 10:10

## 2020-05-26 RX ADMIN — GABAPENTIN 300 MG: 300 CAPSULE ORAL at 21:48

## 2020-05-26 RX ADMIN — SODIUM CHLORIDE, SODIUM LACTATE, POTASSIUM CHLORIDE, AND CALCIUM CHLORIDE 100 ML/HR: 600; 310; 30; 20 INJECTION, SOLUTION INTRAVENOUS at 07:03

## 2020-05-26 RX ADMIN — ROCURONIUM BROMIDE 5 MG: 50 INJECTION, SOLUTION INTRAVENOUS at 07:40

## 2020-05-26 RX ADMIN — FENTANYL CITRATE 25 MCG: 50 INJECTION, SOLUTION INTRAMUSCULAR; INTRAVENOUS at 10:19

## 2020-05-26 RX ADMIN — FENTANYL CITRATE 50 MCG: 50 INJECTION, SOLUTION INTRAMUSCULAR; INTRAVENOUS at 08:17

## 2020-05-26 RX ADMIN — CEFAZOLIN SODIUM 2 G: 1 POWDER, FOR SOLUTION INTRAMUSCULAR; INTRAVENOUS at 07:55

## 2020-05-26 RX ADMIN — SODIUM CHLORIDE, POTASSIUM CHLORIDE, SODIUM LACTATE AND CALCIUM CHLORIDE: 600; 310; 30; 20 INJECTION, SOLUTION INTRAVENOUS at 09:30

## 2020-05-26 RX ADMIN — GABAPENTIN 300 MG: 300 CAPSULE ORAL at 15:49

## 2020-05-26 RX ADMIN — SODIUM CHLORIDE, POTASSIUM CHLORIDE, SODIUM LACTATE AND CALCIUM CHLORIDE: 600; 310; 30; 20 INJECTION, SOLUTION INTRAVENOUS at 07:10

## 2020-05-26 RX ADMIN — METFORMIN HYDROCHLORIDE 1000 MG: 500 TABLET, EXTENDED RELEASE ORAL at 17:53

## 2020-05-26 RX ADMIN — PHENYLEPHRINE HYDROCHLORIDE 40 MCG: 10 INJECTION INTRAVENOUS at 08:11

## 2020-05-26 RX ADMIN — PHENYLEPHRINE HYDROCHLORIDE 80 MCG: 10 INJECTION INTRAVENOUS at 07:48

## 2020-05-26 RX ADMIN — ACETAMINOPHEN 500 MG: 500 TABLET ORAL at 15:49

## 2020-05-26 RX ADMIN — INSULIN HUMAN 4 UNITS: 100 INJECTION, SOLUTION PARENTERAL at 11:13

## 2020-05-26 RX ADMIN — PHENYLEPHRINE HYDROCHLORIDE 40 MCG: 10 INJECTION INTRAVENOUS at 10:09

## 2020-05-26 RX ADMIN — FENTANYL CITRATE 50 MCG: 50 INJECTION, SOLUTION INTRAMUSCULAR; INTRAVENOUS at 07:41

## 2020-05-26 RX ADMIN — FENTANYL CITRATE 50 MCG: 50 INJECTION, SOLUTION INTRAMUSCULAR; INTRAVENOUS at 07:38

## 2020-05-26 RX ADMIN — PHENYLEPHRINE HYDROCHLORIDE 80 MCG: 10 INJECTION INTRAVENOUS at 09:01

## 2020-05-26 RX ADMIN — ROCURONIUM BROMIDE 15 MG: 50 INJECTION, SOLUTION INTRAVENOUS at 08:09

## 2020-05-26 RX ADMIN — PHENYLEPHRINE HYDROCHLORIDE 40 MCG: 10 INJECTION INTRAVENOUS at 09:10

## 2020-05-26 RX ADMIN — ACETAMINOPHEN 500 MG: 500 TABLET ORAL at 21:48

## 2020-05-26 NOTE — ANESTHESIA POSTPROCEDURE EVALUATION
Procedure(s):  ROBOTIC ASSISTED LAPAROSCOPIC HIATAL HERNIA REPAIR WITH MESH/ EGD  ESOPHAGOGASTRODUODENOSCOPY (EGD). general    Anesthesia Post Evaluation        Patient location during evaluation: PACU  Level of consciousness: awake  Pain management: adequate  Airway patency: patent  Anesthetic complications: no  Cardiovascular status: acceptable  Respiratory status: acceptable  Hydration status: acceptable  Post anesthesia nausea and vomiting:  none      Vitals Value Taken Time   /58 5/26/2020 11:20 AM   Temp 36.9 °C (98.5 °F) 5/26/2020 10:41 AM   Pulse 73 5/26/2020 11:24 AM   Resp 23 5/26/2020 11:24 AM   SpO2 91 % 5/26/2020 11:24 AM   Vitals shown include unvalidated device data.

## 2020-05-26 NOTE — INTERVAL H&P NOTE
Update History & Physical 
 
The Patient's History and Physical of May 19th,  was reviewed with the patient and I examined the patient. There was no change. The surgical site was confirmed by the patient and me. Plan:  The risk, benefits, expected outcome, and alternative to the recommended procedure have been discussed with the patient. Patient understands and wants to proceed with the procedure.  
 
Electronically signed by Malgorzata Bean MD on 5/26/2020 at 7:15 AM

## 2020-05-26 NOTE — PERIOP NOTES
TRANSFER - OUT REPORT:    Verbal report given to [facility] RN(name) on Heriberto Mansfield  being transferred to [facility](unit) for routine post - op       Report consisted of patients Situation, Background, Assessment and   Recommendations(SBAR). Information from the following report(s) SBAR, Kardex and MAR was reviewed with the receiving nurse. Lines:   Peripheral IV 05/26/20 Right Antecubital (Active)   Site Assessment Clean, dry, & intact 5/26/2020  7:01 AM   Phlebitis Assessment 0 5/26/2020  7:01 AM   Infiltration Assessment 0 5/26/2020  7:01 AM   Dressing Status Clean, dry, & intact 5/26/2020  7:01 AM   Dressing Type Transparent;Tape 5/26/2020  7:01 AM   Hub Color/Line Status Pink; Infusing;Patent 5/26/2020  7:01 AM   Alcohol Cap Used Yes 5/26/2020  7:01 AM       Peripheral IV 05/26/20 Left Hand (Active)   Site Assessment Clean, dry, & intact 5/26/2020  7:01 AM   Phlebitis Assessment 0 5/26/2020  7:01 AM   Infiltration Assessment 0 5/26/2020  7:01 AM   Dressing Status Clean, dry, & intact 5/26/2020  7:01 AM   Dressing Type Transparent;Tape 5/26/2020  7:01 AM   Hub Color/Line Status Infusing;Pink 5/26/2020  7:01 AM   Alcohol Cap Used Yes 5/26/2020  7:01 AM        Opportunity for questions and clarification was provided.       Patient transported with:   O2 @ 2 liters  Registered Nurse

## 2020-05-26 NOTE — H&P
Name  Rajiv Quach (27GM, F) ID# [id number]  Appt. Date/Time  2020 01:00PM      1950  Service Dept. [facility]    Provider  Hari Grayson MD    Insurance  Med Primary: Haseeb Chapa (Nelson English)  Insurance # : [id number]   Med Secondary: [address] # : [id number]  Policy/Group # : [id number]   Prescription: [address] - Member is eligible. details   Prescription: [address] - Member is eligible. details    Chief Complaint   . hiatal hernia . no pain right now   Patient's Care Team   Referring Provider: Scotty Hernadez MD: Erlinda Payne [facility], [address] [place] [address], Ph [phone], Fax [phone] NPI: [id number]  Primary Care Provider: Reny Jordan MD: [address], [address], Ph [phone], Fax [phone] NPI: [id number]  Urologist: Zoë Heady: [address], [address], Ph [phone], Fax [phone]   Patient's Pharmacies   [facility] [facility]): [address] [place] [address], [address] [place] [address], Ph [phone], Fax [phone]     Ht:  5 ft 2 in Stated (157.48 cm) 2020 01:06 pm   Wt:  163 lbs With clothes (73.94 kg) 2020 01:07 pm   BMI:  29.8 2020 01:07 pm     BP:  145/80 sitting L arm 2020 01:07 pm   O2Sat:  98% Room Air at Rest 2020 01:07 pm   Pulse:  74 bpm regular 2020 01:07 pm     T:  98 F° oral (36.67 C) 2020 01:07 pm   Pain Scale:  0 2020 01:07 pm       Allergies     Reviewed Allergies     NKDA    Medications     Reviewed Medications          amLODIPine 5 mg-benazepriL 20 mg capsule   take 1 capsule by mouth once daily  20 filled  [facility]    hydroCHLOROthiazide 25 mg tablet   take 1 tablet by mouth every morning  19 filled  [facility]    Janumet  mg-1,000 mg tablet,extended release   take 1 tablet by mouth every evening with A MEAL  10/28/19 filled [facility]    meclizine 12.5 mg tablet  03/01/20 filled  [facility]    Motrin  03/05/20 entered  [facility] Oleary    Problems   Reviewed Problems  Hiatal hernia - Onset: 03/05/2020   Family History   Reviewed Family History  Mother  - Diabetes mellitus    Son  - Diabetes mellitus     - Diabetes mellitus    Social History   Reviewed Social History  General Social Hx  Tobacco Smoking Status: Never smoker  Occupation: Retired  Marital status:   Exercise level: Occasional  Diet: Regular  General stress level: Medium  Alcohol intake: None  Caffeine intake: Moderate (Notes: herbal tea daily)  Illicit drugs: no  Advance directive: N  E-cigarette/Vape Status: Never used electronic cigarettes   Surgical History   Reviewed Surgical History  Colonoscopy - 5/2019   Hernia repair   Lithotripsy - 02/13/2020     ovarian cyst removed   Past Medical History   Reviewed Past Medical History  DIABETES: Y  HYPERTENSION: Y  GERD: Y - dysphagia; nausea; esophageal; gastritis  Gastrointestinal Issues: Y - ulcer disease; gastritis  DIZZINESS: (no answer) - light headed  OTHER: (no answer) - diaphragmatic hernia w/o obstruction or gangrene  OTHER 2: (no answer) - kidney stones  Screening   None recorded. HPI   Patient is a 67-year-old woman who presents to my office as a referral from her gastroenterologist for a large symptomatic hiatal hernia. Over the past few months she has been having progressively worsening dysphasia. She has had longstanding reflux for a number of years. She denies having any weight loss, but does have some regurgitation of undigested food if she eats too late before laying down. She does try to avoid greasy and spicy foods. I reviewed a CAT scan and an upper GI which were both performed at [facility] which show a large hiatal hernia with a suspected organoaxial volvulus which is likely the cause of the patient's symptoms of dysphasia.  She denies noticing any blood in her vomit or blood in her stool. She does have a history of kidney stones and does have some right flank pain. She has an appointment tomorrow to see her urologist. She denies ever having an MI or being on blood thinners. She does have diabetes and she states her blood sugars range in the 150s. She is interested in pursuing hiatal hernia repair and she would like to have her surgery done at [facility]. ROS   Patient reports nose/sinus problems but reports no difficulty smelling and no frequent nosebleeds. She reports no pain during urination, no incontinence, no difficulty urinating, no hematuria, no increased frequency, and no flank pain; Kidney stones. She reports dizziness but reports no loss of consciousness, no slurred speech, no weakness, no tingling, no tremors, no seizures, no headaches, no memory lapses or changes, and no loss of balance or falls. She reports no fatigue, no cold intolerance, no heat intolerance, and no unusual body odor; Diabetes. She reports no fever, no chills, no night sweats, and normal appetite. She reports no dry eyes, no irritation, no pain in the eyes, and no visual changes. She reports no difficulty hearing, no ear pain, no vertigo, and no ringing in the ears (tinnitus). She reports no sore throat, no unusual taste of foods, no bleeding gums, and no mouth ulcers. She reports no chest pain, no shortness of breath when walking, no palpitations, no known heart murmur, and no ankle edema. She reports no cough, no wheezing, no shortness of breath, no sputum production, and no coughing up blood. She reports no nausea, no vomiting, not vomiting blood, no abdominal pain, normal appetite, no diarrhea, no constipation, and no heartburn. She reports no muscle aches, no muscle weakness, no muscle cramps, no arthralgias/joint pain, no back pain, no swelling in the extremities, and no difficulty walking.  She reports no dry skin, no abnormal mole, no jaundice, no rashes, no discoloration, and no hair thinning. She reports no irritability, no depression, no anxiety, no panic attacks, no sleep disturbances, and lno thoughts about suicide. She reports no bruising, no swollen glands, no clotting problems, and no bleeding disorders. She reports no runny nose, no nasal congestion, no frequent sneezing, no sinus pressure, no itching, and no hives. Additionally reports: I personally performed the ROS. -ESTEPHANIE WOO   Physical Exam     Patient is a 71-year-old female. Constitutional: General Appearance: healthy-appearing, well-nourished, and well-developed. Level of Distress: no acute distress. Ambulation: ambulating normally. Head: Head: normocephalic and atraumatic. Neck: Neck: trachea midline and full range of motion. Lymph Nodes: no cervical LAD. Cardiovascular: Heart Auscultation: normal S1 and S2 and regular rate and rhythm and no murmurs. Lungs: Respiratory effort: no dyspnea. Auscultation: breath sounds normal, good air movement, and no wheezing. Abdomen: Inspection and Palpation: no tenderness or guarding and soft and non-distended. Bowel Sounds: normal. Liver: non-tender and no hepatomegaly. Spleen: non-tender and no splenomegaly. Hernia: none palpable. Skin: Inspection and palpation: no rash, lesions, or ulcer. Musculoskeletal[de-identified] Extremities: no edema. Motor Strength and Tone: normal tone and motor strength. Joints, Bones, and Muscles: normal movement of all extremities. Neurologic: Cranial Nerves: grossly intact. Psychiatric: Insight: good judgement. Mental Status: normal mood and affect and active and alert. Orientation: to time, place, and person. Memory: recent memory normal.   Assessment / Plan     1. Hiatal hernia - 71-year-old female with large symptomatic hiatal hernia with organoaxial volvulus. 1. I recommended hiatal hernia repair, possibly with mesh. The risks, benefits, and alternatives to surgery were discussed with the patient.   2. She is agreeable to proceed with robotic hiatal hernia repair with mesh on 3/24/2020 at [facility]. Thank you for allowing me to participate in the care of your patient. K44.9: Diaphragmatic hernia without obstruction or gangrene   LAPROSCOPIC SURGICAL REPAIR OF PARA-ESOPHAGEAL HERNIA, FUNDOPLASTY W/ MESH (SURG)   Date of Surgery: 03/24/2020  Robot Assisted?: Y        Return to Office  None recorded.

## 2020-05-26 NOTE — PROGRESS NOTES
5/26/2020 4:12 PM EMR reviewed. Reason for Admission: Elective admit under Dr. Leanne Castaneda   Procedure(s):  ROBOTIC ASSISTED [address] MESH/ EGD  ESOPHAGOGASTRODUODENOSCOPY (EGD)     Assessment:   [x]In person with pt and pt's daughter, Babar Letters   []Via p/c with pt   []With family member in person. Who/Relation:     []With family member via p/c. Who/Relation:     RUR:  5%  Risk Level: [x]Low []Moderate []High  Value-based purchasing: [] Yes [x] No  Bundle patient: [] Yes [x] No   Specify:     Advance Directive: Cammy Santiago Daughter [phone]     Assessment:    Age: 78    Sex: [] Male [x]Female     Residency: [x]Private residence []Apartment []Assisted Living []LTC []Other:   Exterior Steps: 5  Interior Steps: 1 flight to bedroom     Lives With: [x]With daughter    Prior functioning:  [x]Independent []Dependent []Partial dependence   Pt requires assistance with: []Bathing []Dressing []Toileting []Ambulation     Prior DME required:  [x]None []RW []Cane []Crutches []Bedside commode []CPAP []Home O2 (Liter/Provider: ) []Nebulizer   []Shower Chair []Wheelchair []Hospital Bed []Etienne []Stair lift []Rollator []Other:    DME available: [x]None []RW []Cane []Crutches []Bedside commode []CPAP []Home O2 (Liter/Provider: ) []Nebulizer   []Shower Chair []Wheelchair []Hospital Bed []Etienne []Stair lift []Rollator []Other:    Rehab history: [x]None []Outpatient PT []Home Health (Provider/Date: ) []SNF (Provider/Date: ) []IPR (Provider/Date: ) []LTC (Provider/Date: )    Discharge Concerns: []Yes [x]No []Unknown   Describe: Insurer: Medicare and [facility] secondary     PCP: Prema Madsen    Name of Practice: [facility]    Current patient: [x]Yes []No   Approximate date of last visit: 1 month ago virtual visit     Pharmacy: [facility] on [address] Transport: Pt's daughter        Transition of care plan:      [x] Home with outpatient follow-up  CM will follow.  ALENA KauffmanW  Care Management Interventions  PCP Verified by CM: Yes(Jaki Dominguez Signup: No  Discharge Durable Medical Equipment: No  Physical Therapy Consult: Yes  Occupational Therapy Consult: No  Speech Therapy Consult: No  Current Support Network:  Other  Confirm Follow Up Transport: Family  Discharge Location  Discharge Placement: Home with family assistance

## 2020-05-26 NOTE — BRIEF OP NOTE
Brief Postoperative Note    Patient: Dontrell Glover  YOB: 1950  MRN: [id number]    Date of Procedure: 5/26/2020     Pre-Op Diagnosis: HIATAL HERNIA    Post-Op Diagnosis: Same as preoperative diagnosis. Procedure(s):  ROBOTIC ASSISTED LAPAROSCOPIC HIATAL HERNIA REPAIR WITH MESH/ EGD  ESOPHAGOGASTRODUODENOSCOPY (EGD)    Surgeon(s):  Gilford Melena, MD    Surgical Assistant: None    Anesthesia: General     Estimated Blood Loss (mL): Minimal    Complications: None    Specimens: * No specimens in log *     Implants:   Implant Name Type Inv. Item Serial No.  Lot No. LRB No. Used Action   MESH SURG RECT 7X10CM STRL -- PHASIX - SN/A  MESH SURG RECT 7X10CM STRL -- PHASIX N/A BARD DAVOL [id number] N/A 1 Implanted       Drains: * No LDAs found *    Findings: 4 cm hiatal hernia repaired with 4 0-Ethibond sutures with pledgets, Phasix ST mesh reinforcement, Nissen fundoplication.     Job Number [id number]    Electronically Signed by Paxton Perdue MD on 5/26/2020 at 10:20 AM

## 2020-05-27 VITALS
DIASTOLIC BLOOD PRESSURE: 77 MMHG | TEMPERATURE: 98.2 F | SYSTOLIC BLOOD PRESSURE: 144 MMHG | HEART RATE: 60 BPM | OXYGEN SATURATION: 97 % | BODY MASS INDEX: 30.54 KG/M2 | WEIGHT: 167 LBS | RESPIRATION RATE: 14 BRPM

## 2020-05-27 PROCEDURE — 97161 PT EVAL LOW COMPLEX 20 MIN: CPT

## 2020-05-27 PROCEDURE — 74011250636 HC RX REV CODE- 250/636: Performed by: SURGERY

## 2020-05-27 PROCEDURE — 74011250637 HC RX REV CODE- 250/637: Performed by: SURGERY

## 2020-05-27 PROCEDURE — 97530 THERAPEUTIC ACTIVITIES: CPT

## 2020-05-27 RX ADMIN — HYDROCHLOROTHIAZIDE 25 MG: 25 TABLET ORAL at 09:38

## 2020-05-27 RX ADMIN — LISINOPRIL 20 MG: 20 TABLET ORAL at 09:38

## 2020-05-27 RX ADMIN — ACETAMINOPHEN 500 MG: 500 TABLET ORAL at 06:08

## 2020-05-27 RX ADMIN — AMLODIPINE BESYLATE 5 MG: 5 TABLET ORAL at 09:39

## 2020-05-27 RX ADMIN — GABAPENTIN 300 MG: 300 CAPSULE ORAL at 09:38

## 2020-05-27 RX ADMIN — IBUPROFEN 400 MG: 400 TABLET, FILM COATED ORAL at 03:01

## 2020-05-27 RX ADMIN — SODIUM CHLORIDE 50 ML/HR: 900 INJECTION, SOLUTION INTRAVENOUS at 06:08

## 2020-05-27 RX ADMIN — ALOGLIPTIN 25 MG: 25 TABLET, FILM COATED ORAL at 10:08

## 2020-05-27 NOTE — DISCHARGE INSTRUCTIONS
Patient Education        Learning About Nissen Fundoplication Surgery  What is Nissen fundoplication? Nissen fundoplication surgery is done to treat gastroesophageal reflux disease (GERD). In this surgery, the doctor strengthens the valve between the stomach and the esophagus. The esophagus is the tube that connects the mouth to the stomach. A strong valve prevents stomach acid from moving back into the esophagus. The doctor will wrap the upper part of the stomach (fundus) around the lower part of the esophagus. After surgery, you should have fewer symptoms of GERD, such as heartburn. How is it done? Laparoscopic surgery is usually used. A doctor puts a lighted tube, or scope, and other surgical tools through small incisions (cuts) in your belly. The doctor is able to see your organs with the scope. Most people stay in the hospital 2 to 3 days. Your doctor may do an open surgery. The doctor makes a larger cut in the middle of your belly. You will probably stay in the hospital for 4 or 5 days after open surgery. What can you expect after this surgery? You may be sore and have some pain in your belly for several weeks. If you had laparoscopic surgery, you also may have pain near your shoulder for a day or two. It may be hard for you to swallow for up to 6 weeks. You may also have cramping in your belly, feel bloated, or pass more gas than before. The cramping and bloating usually go away in 2 to 3 months. But you may keep passing more gas for a long time. When you burp, you may not get as much relief as you did before the surgery. Or you may not be able to burp after surgery. The surgery makes your stomach a little smaller, so you may get full more quickly when you eat. In 2 to 3 months, the stomach adjusts. You will be able to eat your usual amounts of food. How quickly you recover depends on whether you had a laparoscopic or open surgery.  After laparoscopic surgery, most people can go back to work or their normal routine in about 2 to 3 weeks. It depends on their work. After open surgery, you may need 4 to 6 weeks to get back to your normal routine. The incisions from both types of surgeries leave scars that fade over time. Follow-up care is a key part of your treatment and safety. Be sure to make and go to all appointments, and call your doctor if you are having problems. It's also a good idea to know your test results and keep a list of the medicines you take. Where can you learn more? Go to http://michele-tremaine.info/  Enter R113 in the search box to learn more about \"Learning About Nissen Fundoplication Surgery. \"  Current as of: August 11, 2019Content Version: 12.4  © 1544-0537 Healthwise, Incorporated. Care instructions adapted under license by [employer] (which disclaims liability or warranty for this information). If you have questions about a medical condition or this instruction, always ask your healthcare professional. [address] any warranty or liability for your use of this information.

## 2020-05-27 NOTE — PROGRESS NOTES
Problem: Falls - Risk of  Goal: *Absence of Falls  Description: Document Adalberto Inna Fall Risk and appropriate interventions in the flowsheet.   Outcome: Resolved/Met  Note: Fall Risk Interventions:            Medication Interventions: Patient to call before getting OOB, Teach patient to arise slowly    Elimination Interventions: Call light in reach, Patient to call for help with toileting needs              Problem: Patient Education: Go to Patient Education Activity  Goal: Patient/Family Education  Outcome: Resolved/Met

## 2020-05-27 NOTE — PROGRESS NOTES
SPEECH THERAPY SCREENING:  SERVICES ARE NOT INDICATED AT THIS TIME    [facility] therapy based on results obtained during the nursing admission assessment. The patients chart was reviewed and the patient is not appropriate for a skilled therapy evaluation at this time. Please consult speech therapy if any therapy needs arise. Thank you. Admitted for hiatal hernia repair.   GI appropriate or surgery  Luis Manuel Frazier, SLP

## 2020-05-27 NOTE — PROGRESS NOTES
Nereida Garcia went to discharge patient per order and pt told her that Dr. Marni Herrmann wanted us to contact him to let him know that she was \"all clear\" on our end and he would call in a medication for her. Called office who gave me the number to the surgery center. Called surgery center and was transferred to OR to speak with him. He called back and I spoke with him. He sent prescriptions for flexeril, oxy 5mg, tylenol and motrin to the pharmacy his practice has on file for this patient. Because it is through is practice, this would not show on our AVS.

## 2020-05-27 NOTE — PROGRESS NOTES
Assessment / Plan:   General Surgery Daily Progress Note      Patient: Heriberto Mansfield MRN: [id number]  SSN: [id number]    YOB: 1950  Age: 78 y.o. Sex: female      Admit Date: 5/26/2020 for elective repair of hiatal hernia. Subjective:   Patient is doing well. She denies nausea, vomiting, fevers or chills. Her pain is well controlled. She is tolerating a clear liquid diet. Current Facility-Administered Medications   Medication Dose Route Frequency    0.9% sodium chloride infusion  50 mL/hr IntraVENous CONTINUOUS    acetaminophen (TYLENOL) tablet 500 mg  500 mg Oral Q8H    gabapentin (NEURONTIN) capsule 300 mg  300 mg Oral TID    ibuprofen (MOTRIN) tablet 400 mg  400 mg Oral Q6H PRN    oxyCODONE IR (ROXICODONE) tablet 5 mg  5 mg Oral Q4H PRN    morphine injection 2 mg  2 mg IntraVENous Q4H PRN    hydroCHLOROthiazide (HYDRODIURIL) tablet 25 mg  25 mg Oral DAILY    amLODIPine (NORVASC) tablet 5 mg  5 mg Oral DAILY    lisinopriL (PRINIVIL, ZESTRIL) tablet 20 mg  20 mg Oral DAILY    metFORMIN ER (GLUCOPHAGE XR) tablet 1,000 mg  1,000 mg Oral DAILY WITH DINNER    alogliptin (NESINA) tablet 25 mg  25 mg Oral DAILY        Objective:   05/26 1901 - 05/27 0700  In: -   Out: 550 [Urine:550]  05/25 0701 - 05/26 1900  In: 1697.5 [I.V.:1697.5]  Out: 510 [Urine:500]  Patient Vitals for the past 8 hrs:   BP Temp Pulse Resp SpO2   05/27/20 0329 131/78 98.2 °F (36.8 °C) 60 16 97 %   05/26/20 2339 128/74 97.9 °F (36.6 °C) 67 16 96 %       Physical Exam:  General: Alert, cooperative, no distress, appears stated age. Neck:  Supple, symmetrical, trachea midline, no adenopathy, thyroid: no                           enlargement/tenderness/nodules, no carotid bruit and no JVD. Lungs: Clear to auscultation bilaterally. Heart:  Regular rate and rhythm, S1, S2 normal, no murmur, click, rub or gallop. Abdomen: Soft, tender. Bowel sounds normal. No masses,  No organomegaly.   Extremities: Extremities normal, atraumatic, no cyanosis or edema. Skin:  Skin color, texture, turgor normal. No rashes or lesions    Labs: No results for input(s): WBC, HGB, HCT, PLT, HGBEXT, HCTEXT, PLTEXT in the last 72 hours. No results for input(s): NA, K, CL, CO2, GLU, BUN, CREA, CA, MG, PHOS, ALB, TBIL, TBILI, ALT in the last 72 hours. No lab exists for component: SGOT  X-ray None  ·     Active Problems:    Diaphragmatic hernia with obstruction (5/26/2020)        Problem List Items Addressed This Visit     None        Assessment / Plan:   1. POD #1 robotic hiatal hernia repair with mesh  2. Advance to full liquids  3.  Likely D/C after lunch     Karen Fallon MD  [facility]  Office:  [phone]

## 2020-05-27 NOTE — PROGRESS NOTES
PHYSICAL THERAPY EVALUATION/DISCHARGE  Patient: Polly Robledo (27 y.o. female)  Date: 5/27/2020  Primary Diagnosis: Diaphragmatic hernia with obstruction [K44.0]  Procedure(s) (LRB):  ROBOTIC ASSISTED LAPAROSCOPIC HIATAL HERNIA REPAIR WITH MESH/ EGD (N/A)  ESOPHAGOGASTRODUODENOSCOPY (EGD) (N/A) 1 Day Post-Op   Precautions:          ASSESSMENT  Based on the objective data described below, the patient presents with generally normal gait pattern, normal stength, ROM, with mildly impaired activity tolerance s/p hiatal hernia repair. Patient very pleasant and tolerated evaluation well. Was sat'ing at 91% on RA upon arrival but with activity and gait x 350' it improved to 95%. Overall patient is indep to supervision level with mobility, primarily just to ensure she does not do too much and fatigue. Educated patient in energy conservation with good understanding. Patient lives with family in 2 level home with bedroom upstairs, was fully indep without DME prior to surgery and drives. No concerns for d/c home and no need for further services anticipated. Will complete order. Functional Outcome Measure: The patient scored 26/28 on the Tinetti outcome measure which is indicative of low fall risk. Other factors to consider for discharge: very supportive family     Further skilled acute physical therapy is not indicated at this time. PLAN :  Recommendation for discharge: (in order for the patient to meet his/her long term goals)  No skilled physical therapy/ follow up rehabilitation needs identified at this time. This discharge recommendation:  Has been made in collaboration with the attending provider and/or case management    IF patient discharges home will need the following DME: none       SUBJECTIVE:   Patient stated I do fine for myself and I'll have a little 11year old helper at home.     OBJECTIVE DATA SUMMARY:   HISTORY:    Past Medical History:   Diagnosis Date    Anemia 05/19/2020    8.9    Diabetes ([facility] [address]     GERD (gastroesophageal reflux disease)     Hypertension     Vertigo 02/2020    White coat syndrome with hypertension      Past Surgical History:   Procedure Laterality Date    HX APPENDECTOMY      HX COLONOSCOPY  05/2019    HX HERNIA REPAIR      HX LITHOTRIPSY  02/2020    HX OOPHORECTOMY Right        Prior level of function: indep without DME, active  Personal factors and/or comorbidities impacting plan of care: none    Home Situation  Home Environment: Private residence  # Steps to Enter: 5  Rails to Enter: Yes  Hand Rails : Bilateral  One/Two Story Residence: Two story  # of Interior Steps: 13  Height of Each Step (in): 0 inches  Interior Rails: Right  Lift Chair Available: No  Living Alone: No  Support Systems: Child(moreno), Family member(s)  Patient Expects to be Discharged to[de-identified] Private residence  Current DME Used/Available at Home: None  Tub or Shower Type: Shower    EXAMINATION/PRESENTATION/DECISION MAKING:   Critical Behavior:  Neurologic State: Alert  Orientation Level: Oriented X4  Cognition: Appropriate decision making, Appropriate for age attention/concentration, Appropriate safety awareness, Follows commands     Hearing:   Auditory  Auditory Impairment: None  Hearing Aids/Status: Does not own    Range Of Motion:  AROM: Generally decreased, functional                       Strength:    Strength: Generally decreased, functional                    Tone & Sensation:   Tone: Normal              Sensation: Intact               Coordination:  Coordination: Within functional limits  Vision:      Functional Mobility:  Bed Mobility:  Rolling: Independent  Supine to Sit: Independent     Scooting: Independent  Transfers:  Sit to Stand: Supervision  Stand to Sit: Supervision        Bed to Chair: Supervision              Balance:   Sitting: Intact  Standing: Intact  Ambulation/Gait Training:  Distance (ft): 350 Feet (ft)  Assistive Device: Gait belt  Ambulation - Level of Assistance: Supervision        Gait Abnormalities: Decreased step clearance                                           Functional Measure:  Tinetti test:    Sitting Balance: 1  Arises: 2  Attempts to Rise: 2  Immediate Standing Balance: 2  Standing Balance: 2  Nudged: 2  Eyes Closed: 1  Turn 360 Degrees - Continuous/Discontinuous: 1  Turn 360 Degrees - Steady/Unsteady: 1  Sitting Down: 1  Balance Score: 15 Balance total score  Indication of Gait: 1  R Step Length/Height: 1  L Step Length/Height: 1  R Foot Clearance: 1  L Foot Clearance: 1  Step Symmetry: 1  Step Continuity: 1  Path: 2  Trunk: 2  Walking Time: 0  Gait Score: 11 Gait total score  Total Score: 26/28 Overall total score         Tinetti Tool Score Risk of Falls  <19 = High Fall Risk  19-24 = Moderate Fall Risk  25-28 = Low Fall Risk  Tinetti ME. Performance-Oriented Assessment of Mobility Problems in Elderly Patients. [place] 66; [id number].  (Scoring Description: PT Bulletin Feb. 10, 1993)    Older adults: Bruce Durbin et al, 2009; n = 1000 [facility] elderly evaluated with ABC, ESPERANZA, ADL, and IADL)  · Mean ESPERANZA score for males aged 69-68 years = 26.21(3.40)  · Mean ESPERANZA score for females age 69-68 years = 25.16(4.30)  · Mean ESPERANZA score for males over 80 years = 23.29(6.02)  · Mean ESPERANZA score for females over 80 years = 17.20(8.32)            Physical Therapy Evaluation Charge Determination   History Examination Presentation Decision-Making   MEDIUM  Complexity : 1-2 comorbidities / personal factors will impact the outcome/ POC  MEDIUM Complexity : 3 Standardized tests and measures addressing body structure, function, activity limitation and / or participation in recreation  LOW Complexity : Stable, uncomplicated  Other outcome measures Tinetti 26/28  LOW       Based on the above components, the patient evaluation is determined to be of the following complexity level: LOW     Pain Rating:  None reported    Activity Tolerance:   Good  Please refer to the flowsheet for vital signs taken during this treatment. After treatment patient left in no apparent distress:   Sitting in chair, Call bell within reach and Caregiver / family present    COMMUNICATION/EDUCATION:   The patients plan of care was discussed with: Registered nurse. Fall prevention education was provided and the patient/caregiver indicated understanding.     Thank you for this referral.  Parmjit Mar, PT   Time Calculation: 24 mins

## 2020-05-27 NOTE — OP NOTES
[facility] 78  OPERATIVE REPORT    Name:  Lewis Roldan  MR#:  [id number]  :  1950  ACCOUNT #:  [de-identified]  DATE OF SERVICE:  2020    PREOPERATIVE DIAGNOSIS:  Hiatal hernia with obstruction. POSTOPERATIVE DIAGNOSIS:  Hiatal hernia with obstruction. PROCEDURE PERFORMED:  Robot-assisted laparoscopic hiatal hernia repair with mesh with upper endoscopy. SURGEON:  Rande Blizzard, MD    [facility]    ANESTHESIA:  General.    COMPLICATIONS:  None. SPECIMENS REMOVED:  None. IMPLANTS:  Phasix ST 7 x 10 cm mesh. ESTIMATED BLOOD LOSS:  Minimal.    DRAINS:  No drains. DISPOSITION:  The patient was then extubated and transferred to the recovery room in stable condition. PROCEDURE IN DETAIL:  The patient is a 27-year-old woman who presented to my office with a symptomatic hiatal hernia with obstruction. After discussion of the risks, benefits and alternatives to surgery, she was agreeable to proceed with hiatal hernia repair, possibly with mesh with upper endoscopy. The patient was brought to the OR and placed in the supine position on the OR table. She was intubated without difficulty. Her abdomen was prepped and draped in a sterile fashion. A time-out was performed. A left subcostal 5-mm Optiview trocar was introduced and the abdomen was insufflated. Under visualization, a supraumbilical 8-mm robotic trocar and two right-sided 8-mm robotic trocars were placed. The patient was then placed into reverse Trendelenburg position of 13 degrees and through a subxiphoid incision, the German retractor was inserted and used to elevate the left lobe of liver anteriorly. This revealed a sizeable hiatal hernia defect. The greater curvature of the stomach was dissected from its lateral attachments at the omentum and short gastric vessels using a vessel sealing device.   The hernia sac was incised using cautery and hook dissector and circumferentially dissected from the esophagus starting in a posterior to anterior direction along the anterior edge of the left max. Pars flaccida was then opened using a vessel sealer and in a similar fashion, a crural dissection was performed using a Hook dissector with cautery also in the posterior to anterior direction until both dissection lines met. The esophagus was encircled and retracted using a tip of fenestrated grasper. This allowed for a complete circumferential dissection of the esophagus at the GE junction revealing a 4-5 cm hiatal hernia defect. This defect was closed with interrupted 0 Ethibond sutures with felt pledgets, two in the posterior location and two anterior to the esophagus. A 50-Hong Konger tapered bougie was advanced by Anesthesia and passed easily without tortuosity or obstruction at the cruroplasty. The Phasix mesh was then cut in the appropriate size and shape with a keyhole for the esophagus and placed into the peritoneal cavity. It was secured to the cruroplasty with 2-0 Vicryl sutures in interrupted fashion and the smooth side was facing the viscera. A fundoplication was then performed in 360-degree manner in a typical Nissen style. 0 Ethibond sutures with felt pledgets x2 were placed superiorly with the first bite incorporating the anterior wall of the esophagus. I was careful to avoid injuring the anterior vagus nerve throughout the procedure. A third suture was placed without pledgets to complete a 360-degree 2.5 cm floppy Nissen wrap. I then performed an upper endoscopy. I was able to intubate the stomach without difficulty. There was no evidence of any bleeding. Retroflexed view revealed an intact [id number] fundoplication. There was no significant tortuosity of the distal esophagus. I aspirated all fluid and air from the stomach and esophagus and removed the endoscope. All robotic instruments were then removed and the [facility] retractor was also removed.   Skin was closed at all sites using 4-0 Monocryl subcuticular stitches. At the beginning of the case, 60 mL of 0.25% plain Marcaine was injected bilaterally for the transversus abdominis plane block under direct laparoscopic visualization. All instrument and sponge counts were correct and I was present for the entire case.       Anita Schwartz MD      [id number]  D:  05/26/2020 10:27  T:  05/26/2020 20:02  JOB #:  [id number]

## 2020-06-15 NOTE — DISCHARGE SUMMARY
[address] SUMMARY    Name:  Mariella Guillen  MR#:  [id number]  :  1950  ACCOUNT #:  [de-identified]  ADMIT DATE:  2020  DISCHARGE DATE:  2020    HOSPITAL COURSE:  The patient is a 50-year-old woman who presented to my office with a symptomatic hiatal hernia. After discussion of the risks, benefits, and alternatives of surgery, she was agreeable to proceed with robotic hiatal hernia repair with mesh. She presented electively for her procedure at [facility]. Her surgery was uneventful. Postoperatively, she was transferred to the surgical floor where her diet was advanced from clear liquids up to a full liquid diet. She was ambulating and using an incentive spirometer. Her pain was controlled. She was considered stable for discharge to home on 2020 and follow up with me in 2 weeks.       Yovani Phelps MD      [id number]  D:  06/15/2020 12:29  T:  06/15/2020 16:17  JOB #:  [id number]

## 2020-07-21 PROBLEM — K44.9 HIATAL HERNIA: Status: ACTIVE | Noted: 2020-07-21

## 2020-07-23 ENCOUNTER — OFFICE VISIT (OUTPATIENT)
Dept: SURGERY | Age: 70
End: 2020-07-23
Payer: COMMERCIAL

## 2020-07-23 VITALS
DIASTOLIC BLOOD PRESSURE: 77 MMHG | HEART RATE: 68 BPM | SYSTOLIC BLOOD PRESSURE: 135 MMHG | TEMPERATURE: 98 F | WEIGHT: 157 LBS | OXYGEN SATURATION: 96 % | BODY MASS INDEX: 28.89 KG/M2 | HEIGHT: 62 IN

## 2020-07-23 DIAGNOSIS — Z09 POSTOPERATIVE EXAMINATION: Primary | ICD-10-CM

## 2020-07-23 DIAGNOSIS — K44.9 HIATAL HERNIA: ICD-10-CM

## 2020-07-23 PROCEDURE — 99024 POSTOP FOLLOW-UP VISIT: CPT | Performed by: PHYSICIAN ASSISTANT

## 2020-07-23 RX ORDER — ACETAMINOPHEN 500 MG
500 TABLET ORAL AS NEEDED
COMMUNITY
Start: 2020-05-27

## 2020-07-23 NOTE — PROGRESS NOTES
General Surgery Follow Up    Subjective:      Jason Goodson is a 78 y.o. female presents for postop care following robotic hiatal hernia repair with mesh on May 26, 2020. She was recently hospitalized due to weakness but her symptoms improved once her constipation resolved and her blood sugars were under control. Appetite is improving. Eating a regular diet without difficulty. She is able to eat chicken, oatmeal, veggies, and fruits without dysphagia. Bowel movements are regular. The patient is voiding without difficulty. The patient is not having any pain. She also denies fever, chills, nausea, vomiting, and reflux. She was previously complaining of a lot of gas pain but this has resolved. Objective:     Visit Vitals  /77 (BP 1 Location: Left arm, BP Patient Position: Sitting)   Pulse 68   Temp 98 °F (36.7 °C) (Oral)   Ht 5' 2\" (1.575 m)   Wt 157 lb (71.2 kg)   SpO2 96%   BMI 28.72 kg/m²       General:  alert, cooperative, no distress, appears stated age   Abdomen: soft, non-tender, no abnormal masses, no hernias   Incision:   healing well, no drainage, no erythema, no hernia, no seroma, no swelling, no dehiscence, incision well approximated     Assessment:     Doing well postoperatively. Plan:     1. Advance diet as tolerated. No further dietary restrictions  2. I continue to recommend PCP follow up for blood sugar control  3. Pt is to increase activities as tolerated.   4. F/u PRN

## 2021-01-29 ENCOUNTER — OFFICE VISIT (OUTPATIENT)
Dept: ENT CLINIC | Age: 71
End: 2021-01-29
Payer: MEDICARE

## 2021-01-29 VITALS
OXYGEN SATURATION: 96 % | BODY MASS INDEX: 28.23 KG/M2 | HEIGHT: 62 IN | WEIGHT: 153.4 LBS | DIASTOLIC BLOOD PRESSURE: 70 MMHG | SYSTOLIC BLOOD PRESSURE: 130 MMHG | HEART RATE: 72 BPM | TEMPERATURE: 97.1 F

## 2021-01-29 DIAGNOSIS — H90.3 SENSORINEURAL HEARING LOSS (SNHL) OF BOTH EARS: Primary | ICD-10-CM

## 2021-01-29 DIAGNOSIS — H90.3 SENSORINEURAL HEARING LOSS (SNHL) OF BOTH EARS: ICD-10-CM

## 2021-01-29 DIAGNOSIS — H61.21 IMPACTED CERUMEN OF RIGHT EAR: ICD-10-CM

## 2021-01-29 DIAGNOSIS — H61.303 EXTERNAL AUDITORY CANAL STENOSIS, BILATERAL: ICD-10-CM

## 2021-01-29 PROCEDURE — G8419 CALC BMI OUT NRM PARAM NOF/U: HCPCS | Performed by: OTOLARYNGOLOGY

## 2021-01-29 PROCEDURE — G8510 SCR DEP NEG, NO PLAN REQD: HCPCS | Performed by: OTOLARYNGOLOGY

## 2021-01-29 PROCEDURE — G8536 NO DOC ELDER MAL SCRN: HCPCS | Performed by: OTOLARYNGOLOGY

## 2021-01-29 PROCEDURE — 3017F COLORECTAL CA SCREEN DOC REV: CPT | Performed by: OTOLARYNGOLOGY

## 2021-01-29 PROCEDURE — 99213 OFFICE O/P EST LOW 20 MIN: CPT | Performed by: OTOLARYNGOLOGY

## 2021-01-29 PROCEDURE — G8427 DOCREV CUR MEDS BY ELIG CLIN: HCPCS | Performed by: OTOLARYNGOLOGY

## 2021-01-29 PROCEDURE — 69210 REMOVE IMPACTED EAR WAX UNI: CPT | Performed by: OTOLARYNGOLOGY

## 2021-01-29 PROCEDURE — G8400 PT W/DXA NO RESULTS DOC: HCPCS | Performed by: OTOLARYNGOLOGY

## 2021-01-29 PROCEDURE — 92567 TYMPANOMETRY: CPT | Performed by: AUDIOLOGIST

## 2021-01-29 PROCEDURE — 92557 COMPREHENSIVE HEARING TEST: CPT | Performed by: AUDIOLOGIST

## 2021-01-29 PROCEDURE — 1090F PRES/ABSN URINE INCON ASSESS: CPT | Performed by: OTOLARYNGOLOGY

## 2021-01-29 PROCEDURE — 1101F PT FALLS ASSESS-DOCD LE1/YR: CPT | Performed by: OTOLARYNGOLOGY

## 2021-01-29 NOTE — PROGRESS NOTES
Subjective:    Gloria Castro   69 y.o.   1950     Followup Visit    Location -bilateral ears    Quality -hearing loss    Severity -moderate    Duration -ears    Timing -ongoing    Context -patient presents for ear checkup, has needed ears cleaned in the past has not been seen in over a year, right ear was feeling more clogged she has been using peroxide which has helped. She is also concerned about hearing loss and is interested in a hearing test today. Modifying Features -none    Associated symptoms/signs -none      Review of Systems  Review of Systems   Constitutional: Negative for chills and fever. HENT: Positive for hearing loss. Negative for ear pain, nosebleeds and tinnitus. Eyes: Negative for blurred vision and double vision. Respiratory: Negative for cough, sputum production and shortness of breath. Cardiovascular: Negative for chest pain and palpitations. Gastrointestinal: Negative for heartburn, nausea and vomiting. Musculoskeletal: Negative for joint pain and neck pain. Skin: Negative. Neurological: Negative for dizziness, speech change, weakness and headaches. Endo/Heme/Allergies: Negative for environmental allergies. Does not bruise/bleed easily. Psychiatric/Behavioral: Negative for memory loss. The patient does not have insomnia. Past Medical History:   Diagnosis Date    Anemia 05/19/2020    8.9    Diabetes ([facility] [address]     DM (diabetes mellitus) ([facility] [address]     GERD (gastroesophageal reflux disease)     Hypertension     Vertigo 02/2020    White coat syndrome with hypertension      Prior to Admission medications    Medication Sig Start Date End Date Taking? Authorizing Provider   amLODIPine-benazepril (LOTREL) 5-20 mg per capsule Take 1 Cap by mouth daily. Yes Provider, Historical   hydroCHLOROthiazide (HYDRODIURIL) 25 mg tablet Take 25 mg by mouth daily. Yes Provider, Historical   cyanocobalamin, vitamin B-12, (VITAMIN B-12 PO) Take 1 Tab by mouth daily.    Yes Provider, Historical   acetaminophen (TYLENOL) 500 mg tablet Take 500 mg by mouth as needed. 5/27/20   Provider, Historical   SITagliptin-metFORMIN (Janumet XR) 100-1,000 mg TM24 Take 1 Tab by mouth every evening. Provider, Historical            Objective:     Visit Vitals  /70   Pulse 72   Temp 97.1 °F (36.2 °C)   Ht 5' 2\" (1.575 m)   Wt 153 lb 6.4 oz (69.6 kg)   SpO2 96%   BMI 28.06 kg/m²        Physical Exam  Vitals signs reviewed. Constitutional:       General: She is awake. She is not in acute distress. Appearance: Normal appearance. She is well-groomed and normal weight. HENT:      Head: Normocephalic and atraumatic. Jaw: There is normal jaw occlusion. No trismus, tenderness or malocclusion. Salivary Glands: Right salivary gland is not diffusely enlarged or tender. Left salivary gland is not diffusely enlarged or tender. Right Ear: Tympanic membrane, ear canal and external ear normal. Decreased hearing noted. There is impacted cerumen. Left Ear: Tympanic membrane, ear canal and external ear normal. Decreased hearing noted. Ears:      Gilmore exam findings: does not lateralize. Right Rinne: AC > BC. Left Rinne: AC > BC. Comments: Collapsed EAM bilaterally      Procedure - Removal Impacted Cerumen    Indications: cerumen impaction    Ears are examined under microscope/headlight.  right ears are cleaned using otologic curette, suction, and/or alligator forceps. Nose: No nasal deformity, septal deviation or mucosal edema. Right Nostril: No epistaxis. Left Nostril: No epistaxis. Right Turbinates: Not enlarged, swollen or pale. Left Turbinates: Not enlarged, swollen or pale. Right Sinus: No maxillary sinus tenderness or frontal sinus tenderness. Left Sinus: No maxillary sinus tenderness or frontal sinus tenderness. Mouth/Throat:      Lips: Pink. No lesions. Mouth: Mucous membranes are moist. No oral lesions. Dentition: Normal dentition. No dental caries. Tongue: No lesions. Palate: No mass and lesions. Pharynx: Oropharynx is clear. Uvula midline. No oropharyngeal exudate or posterior oropharyngeal erythema. Tonsils: No tonsillar exudate. 0 on the right. 0 on the left. Eyes:      General: Vision grossly intact. Extraocular Movements: Extraocular movements intact. Right eye: No nystagmus. Left eye: No nystagmus. Conjunctiva/sclera: Conjunctivae normal.      Pupils: Pupils are equal, round, and reactive to light. Neck:      Musculoskeletal: No edema or erythema. Thyroid: No thyroid mass, thyromegaly or thyroid tenderness. Trachea: Trachea and phonation normal. No tracheal tenderness or tracheal deviation. Cardiovascular:      Rate and Rhythm: Normal rate and regular rhythm. Pulmonary:      Effort: Pulmonary effort is normal. No respiratory distress. Breath sounds: No stridor. Musculoskeletal: Normal range of motion. General: No swelling or tenderness. Lymphadenopathy:      Cervical: No cervical adenopathy. Skin:     General: Skin is warm and dry. Findings: No lesion or rash. Neurological:      General: No focal deficit present. Mental Status: She is alert and oriented to person, place, and time. Mental status is at baseline. Cranial Nerves: Cranial nerves are intact. Coordination: Romberg sign negative. Gait: Gait is intact. Comments: Negative Hallpike   Psychiatric:         Mood and Affect: Mood normal.         Behavior: Behavior normal. Behavior is cooperative. Assessment/Plan:     Encounter Diagnoses   Name Primary?     Sensorineural hearing loss (SNHL) of both ears Yes    Impacted cerumen of right ear     External auditory canal stenosis, bilateral      Audiogram showing sensorineural hearing loss, she can continue to work with Dr. Michael Cunningham on possibility of amplification  Ears cleaned today, follow-up 6 months    Orders Placed This Encounter    REFERRAL TO AUDIOLOGY     Follow-up and Dispositions    · Return in about 6 months (around 7/29/2021).

## 2021-01-29 NOTE — PROGRESS NOTES
Jess Mcdaniel, a 69y.o. year old female, was seen in ENT clinic today for a hearing evaluation on referral from Dr. Oriana Servin. Patient complains of hearing loss. She was also seen today for ear cleaning and states that her ears get \"clogged\" often. She reports she often has to ask people to repeat themselves, and uses her phone on speaker. She also reports feeling like \"something is stuck\" in her throat and states that this has been going on for many years. Otoscopy: normal external ear canals and visible tympanic membranes, bilaterally. Narrow ear canals. Tympanometry: RE Type A, normal  LE unable to obtain    SRT: RE Speech Reception Threshold (SRT) was obtained at 40 dBHL LE Speech Reception Threshold (SRT) was obtained at 40 dBHL    WRS: RE Excellent in quiet when words were presented at 80 dBHL. WRS: LE Excellent in quiet when words were presented at 80 dBHL. Pure tone audiometry: Mild to moderate sensorineural hearing loss, bilaterally. Sensorineural hearing loss, bilaterally. Impressions:  hearing loss requiring medical/otologic and audiologic follow-up  Discussed results of today's testing with patient. Provided audiogram of familiar sounds. Patient is a good candidate to try amplification. Recommended hearing aid evaluation appointment to further discuss options and demo hearing aids; patient agreed. Plan:  Follow-up with ENT. Repeat audiogram upon request.  Hearing aid evaluation (scheduled for 2-4-2021).     BARBARA Andrew   Doctor of Audiology

## 2021-01-29 NOTE — LETTER
1/29/2021 Patient: Romulo Capps YOB: 1950 Date of Visit: 1/29/2021 Rita Blevins MD 
[address] [facility] [address] [address] Fax: [phone] Dear Rita Blevins MD, Thank you for referring Ms. Jeannine German to [facility]ER[facility] for evaluation. My notes for this consultation are attached. If you have questions, please do not hesitate to call me. I look forward to following your patient along with you. Sincerely, Salbador Deal MD

## 2021-01-29 NOTE — Clinical Note
Patient states that she did not mention this to you but she is having issues with her throat, feeling like something is \"stuck\" in there, going on for years, wanted to bring you into the loop.  She is scheduled for 6mo ear cleaning with you already

## 2021-02-04 ENCOUNTER — OFFICE VISIT (OUTPATIENT)
Dept: ENT CLINIC | Age: 71
End: 2021-02-04
Payer: MEDICARE

## 2021-02-04 VITALS — TEMPERATURE: 97.1 F

## 2021-02-04 DIAGNOSIS — H90.3 SENSORINEURAL HEARING LOSS (SNHL) OF BOTH EARS: ICD-10-CM

## 2021-02-04 PROCEDURE — V5010 ASSESSMENT FOR HEARING AID: HCPCS | Performed by: AUDIOLOGIST

## 2021-02-04 NOTE — PROGRESS NOTES
Pt. Name: Mo Lenz   : 1950  MRN: [id number]    Appointment type: Hearing Aid Evaluation  Patient is a very pleasant 69y.o. year old female referred by Dr. Shania Ford for a hearing aid evaluation. Patient's last hearing test was 2021 which shows a bilateral mild to moderate sensorineural hearing loss. Patient reports difficulty understanding speech, especially in the presence of background noise. She also has difficulty on the phone. Reviewed audiogram with patient and discussed hearing aid candidacy. Went over hearing aid style, technology and cost. Did demo Oticon Opn S1-R for patient today in office; patient was pleased with sound quality. Recommended trial with hearing aids. Patient agreed. Provided pricing information for Ot1RP Media More 3-R miniRITE and provided patient with literature on the Oticon More. Patient would like to think about it and discuss with her children. Reassured patient that the price quote is good for 6 months. Hearing Aid Aguilar Fuss form can be found under Media. Plan: Patient to call with decision. Will order hearing aids and schedule HAF when patient decides.       BARBARA Dempsey  Doctor of Audiology

## 2022-03-18 PROBLEM — K44.9 HIATAL HERNIA: Status: ACTIVE | Noted: 2020-07-21

## 2022-03-18 PROBLEM — H90.3 SENSORINEURAL HEARING LOSS (SNHL) OF BOTH EARS: Status: ACTIVE | Noted: 2021-01-29

## 2022-03-19 PROBLEM — H61.21 IMPACTED CERUMEN OF RIGHT EAR: Status: ACTIVE | Noted: 2021-01-29

## 2022-03-20 PROBLEM — K44.0 DIAPHRAGMATIC HERNIA WITH OBSTRUCTION: Status: ACTIVE | Noted: 2020-05-26

## 2022-03-20 PROBLEM — H61.303 EXTERNAL AUDITORY CANAL STENOSIS, BILATERAL: Status: ACTIVE | Noted: 2021-01-29

## 2023-05-22 RX ORDER — AMLODIPINE BESYLATE AND BENAZEPRIL HYDROCHLORIDE 5; 20 MG/1; MG/1
1 CAPSULE ORAL DAILY
COMMUNITY

## 2023-05-22 RX ORDER — SITAGLIPTIN AND METFORMIN HYDROCHLORIDE 1000; 100 MG/1; MG/1
1 TABLET, FILM COATED, EXTENDED RELEASE ORAL EVERY EVENING
COMMUNITY

## 2023-05-22 RX ORDER — HYDROCHLOROTHIAZIDE 25 MG/1
25 TABLET ORAL DAILY
COMMUNITY

## 2023-05-22 RX ORDER — ACETAMINOPHEN 500 MG
500 TABLET ORAL PRN
COMMUNITY
Start: 2020-05-27

## 2023-10-19 ENCOUNTER — APPOINTMENT (OUTPATIENT)
Facility: HOSPITAL | Age: 73
End: 2023-10-19
Payer: MEDICARE

## 2023-10-19 ENCOUNTER — HOSPITAL ENCOUNTER (INPATIENT)
Facility: HOSPITAL | Age: 73
LOS: 1 days | Discharge: HOME | End: 2023-10-21
Attending: EMERGENCY MEDICINE | Admitting: INTERNAL MEDICINE
Payer: MEDICARE

## 2023-10-19 DIAGNOSIS — D72.820 LYMPHOCYTOSIS: ICD-10-CM

## 2023-10-19 DIAGNOSIS — J18.9 PNEUMONIA OF BOTH LOWER LOBES DUE TO INFECTIOUS ORGANISM: Primary | ICD-10-CM

## 2023-10-19 DIAGNOSIS — R11.2 NAUSEA AND VOMITING, UNSPECIFIED VOMITING TYPE: ICD-10-CM

## 2023-10-19 LAB
ALBUMIN SERPL-MCNC: 3.8 G/DL (ref 3.5–5.2)
ALBUMIN/GLOB SERPL: 1 (ref 1.1–2.2)
ALP SERPL-CCNC: 45 U/L (ref 35–104)
ALT SERPL-CCNC: 5 U/L (ref 10–35)
ANION GAP SERPL CALC-SCNC: 19 MMOL/L (ref 5–15)
AST SERPL-CCNC: 25 U/L (ref 10–35)
BASOPHILS # BLD: 0 K/UL (ref 0–0.1)
BASOPHILS NFR BLD: 0 % (ref 0–1)
BILIRUB SERPL-MCNC: 1 MG/DL (ref 0.2–1)
BUN SERPL-MCNC: 12 MG/DL (ref 8–23)
BUN/CREAT SERPL: 26 (ref 12–20)
CALCIUM SERPL-MCNC: 9.2 MG/DL (ref 8.8–10.2)
CHLORIDE SERPL-SCNC: 97 MMOL/L (ref 98–107)
CO2 SERPL-SCNC: 21 MMOL/L (ref 22–29)
CREAT SERPL-MCNC: 0.47 MG/DL (ref 0.5–0.9)
DIFFERENTIAL METHOD BLD: ABNORMAL
EOSINOPHIL # BLD: 0 K/UL (ref 0–0.4)
EOSINOPHIL NFR BLD: 0 % (ref 0–7)
ERYTHROCYTE [DISTWIDTH] IN BLOOD BY AUTOMATED COUNT: 13 % (ref 11.5–14.5)
FLUAV AG NPH QL IA: NEGATIVE
FLUBV AG NOSE QL IA: NEGATIVE
GLOBULIN SER CALC-MCNC: 3.7 G/DL (ref 2–4)
GLUCOSE SERPL-MCNC: 231 MG/DL (ref 65–100)
HCT VFR BLD AUTO: 38.8 % (ref 35–47)
HGB BLD-MCNC: 12.9 G/DL (ref 11.5–16)
IMM GRANULOCYTES # BLD AUTO: 0.2 K/UL (ref 0–0.04)
IMM GRANULOCYTES NFR BLD AUTO: 1 % (ref 0–0.5)
LIPASE SERPL-CCNC: 8 U/L (ref 13–60)
LYMPHOCYTES # BLD: 0.6 K/UL (ref 0.8–3.5)
LYMPHOCYTES NFR BLD: 3 % (ref 12–49)
MCH RBC QN AUTO: 28.5 PG (ref 26–34)
MCHC RBC AUTO-ENTMCNC: 33.2 G/DL (ref 30–36.5)
MCV RBC AUTO: 85.8 FL (ref 80–99)
MONOCYTES # BLD: 0.8 K/UL (ref 0–1)
MONOCYTES NFR BLD: 4 % (ref 5–13)
NEUTS SEG # BLD: 18.7 K/UL (ref 1.8–8)
NEUTS SEG NFR BLD: 92 % (ref 32–75)
NRBC # BLD: 0 K/UL (ref 0–0.01)
NRBC BLD-RTO: 0 PER 100 WBC
PLATELET # BLD AUTO: 261 K/UL (ref 150–400)
PMV BLD AUTO: 10 FL (ref 8.9–12.9)
POTASSIUM SERPL-SCNC: 3.6 MMOL/L (ref 3.5–5.1)
PROT SERPL-MCNC: 7.5 G/DL (ref 6.4–8.3)
RBC # BLD AUTO: 4.52 M/UL (ref 3.8–5.2)
RBC MORPH BLD: ABNORMAL
SARS-COV-2 RDRP RESP QL NAA+PROBE: NOT DETECTED
SODIUM SERPL-SCNC: 137 MMOL/L (ref 136–145)
SOURCE: NORMAL
WBC # BLD AUTO: 20.3 K/UL (ref 3.6–11)

## 2023-10-19 PROCEDURE — 99285 EMERGENCY DEPT VISIT HI MDM: CPT

## 2023-10-19 PROCEDURE — 87804 INFLUENZA ASSAY W/OPTIC: CPT

## 2023-10-19 PROCEDURE — 83690 ASSAY OF LIPASE: CPT

## 2023-10-19 PROCEDURE — 74177 CT ABD & PELVIS W/CONTRAST: CPT

## 2023-10-19 PROCEDURE — 96374 THER/PROPH/DIAG INJ IV PUSH: CPT

## 2023-10-19 PROCEDURE — 2580000003 HC RX 258: Performed by: EMERGENCY MEDICINE

## 2023-10-19 PROCEDURE — 6360000004 HC RX CONTRAST MEDICATION: Performed by: EMERGENCY MEDICINE

## 2023-10-19 PROCEDURE — 80053 COMPREHEN METABOLIC PANEL: CPT

## 2023-10-19 PROCEDURE — 96375 TX/PRO/DX INJ NEW DRUG ADDON: CPT

## 2023-10-19 PROCEDURE — 36415 COLL VENOUS BLD VENIPUNCTURE: CPT

## 2023-10-19 PROCEDURE — 6360000002 HC RX W HCPCS: Performed by: EMERGENCY MEDICINE

## 2023-10-19 PROCEDURE — 71250 CT THORAX DX C-: CPT

## 2023-10-19 PROCEDURE — 85025 COMPLETE CBC W/AUTO DIFF WBC: CPT

## 2023-10-19 PROCEDURE — 87635 SARS-COV-2 COVID-19 AMP PRB: CPT

## 2023-10-19 PROCEDURE — 93005 ELECTROCARDIOGRAM TRACING: CPT | Performed by: EMERGENCY MEDICINE

## 2023-10-19 PROCEDURE — 71045 X-RAY EXAM CHEST 1 VIEW: CPT

## 2023-10-19 RX ORDER — MORPHINE SULFATE 4 MG/ML
4 INJECTION, SOLUTION INTRAMUSCULAR; INTRAVENOUS
Status: COMPLETED | OUTPATIENT
Start: 2023-10-19 | End: 2023-10-19

## 2023-10-19 RX ORDER — METOCLOPRAMIDE HYDROCHLORIDE 5 MG/ML
10 INJECTION INTRAMUSCULAR; INTRAVENOUS
Status: COMPLETED | OUTPATIENT
Start: 2023-10-19 | End: 2023-10-19

## 2023-10-19 RX ORDER — TRAMADOL HYDROCHLORIDE 50 MG/1
50 TABLET ORAL EVERY 8 HOURS PRN
Status: ON HOLD | COMMUNITY
Start: 2023-09-26 | End: 2023-10-21 | Stop reason: HOSPADM

## 2023-10-19 RX ORDER — ATENOLOL 25 MG/1
25 TABLET ORAL DAILY
COMMUNITY
Start: 2023-07-21

## 2023-10-19 RX ORDER — DIPHENHYDRAMINE HYDROCHLORIDE 50 MG/ML
25 INJECTION INTRAMUSCULAR; INTRAVENOUS
Status: COMPLETED | OUTPATIENT
Start: 2023-10-19 | End: 2023-10-19

## 2023-10-19 RX ORDER — 0.9 % SODIUM CHLORIDE 0.9 %
1000 INTRAVENOUS SOLUTION INTRAVENOUS ONCE
Status: COMPLETED | OUTPATIENT
Start: 2023-10-19 | End: 2023-10-19

## 2023-10-19 RX ORDER — ONDANSETRON 2 MG/ML
4 INJECTION INTRAMUSCULAR; INTRAVENOUS ONCE
Status: COMPLETED | OUTPATIENT
Start: 2023-10-19 | End: 2023-10-19

## 2023-10-19 RX ORDER — MECLIZINE HCL 25MG 25 MG/1
TABLET, CHEWABLE ORAL
Status: ON HOLD | COMMUNITY
Start: 2022-08-28 | End: 2023-10-21 | Stop reason: HOSPADM

## 2023-10-19 RX ADMIN — DIPHENHYDRAMINE HYDROCHLORIDE 25 MG: 50 INJECTION INTRAMUSCULAR; INTRAVENOUS at 21:00

## 2023-10-19 RX ADMIN — IOPAMIDOL 100 ML: 755 INJECTION, SOLUTION INTRAVENOUS at 21:11

## 2023-10-19 RX ADMIN — MORPHINE SULFATE 4 MG: 4 INJECTION, SOLUTION INTRAMUSCULAR; INTRAVENOUS at 21:00

## 2023-10-19 RX ADMIN — ONDANSETRON 4 MG: 2 INJECTION INTRAMUSCULAR; INTRAVENOUS at 20:09

## 2023-10-19 RX ADMIN — METOCLOPRAMIDE 10 MG: 5 INJECTION, SOLUTION INTRAMUSCULAR; INTRAVENOUS at 21:00

## 2023-10-19 RX ADMIN — SODIUM CHLORIDE 1000 ML: 9 INJECTION, SOLUTION INTRAVENOUS at 20:09

## 2023-10-19 ASSESSMENT — LIFESTYLE VARIABLES
HOW OFTEN DO YOU HAVE A DRINK CONTAINING ALCOHOL: NEVER
HOW MANY STANDARD DRINKS CONTAINING ALCOHOL DO YOU HAVE ON A TYPICAL DAY: PATIENT DOES NOT DRINK

## 2023-10-19 ASSESSMENT — ENCOUNTER SYMPTOMS
NAUSEA: 1
VOMITING: 1
RESPIRATORY NEGATIVE: 1
EYES NEGATIVE: 1

## 2023-10-19 ASSESSMENT — PAIN - FUNCTIONAL ASSESSMENT: PAIN_FUNCTIONAL_ASSESSMENT: NONE - DENIES PAIN

## 2023-10-19 NOTE — ED TRIAGE NOTES
Pt in ED with c/o nausea and vomiting x 6 times.  Pt had endoscopy this morning at 0900 to check hernia repair done in 2020 and was home at 1100, pt began to feel very cold and nauseous around 1pm.

## 2023-10-20 PROBLEM — J69.0 ASPIRATION PNEUMONIA OF BOTH LOWER LOBES DUE TO GASTRIC SECRETIONS (HCC): Status: ACTIVE | Noted: 2023-10-20

## 2023-10-20 PROBLEM — I10 HTN (HYPERTENSION), BENIGN: Status: ACTIVE | Noted: 2023-10-20

## 2023-10-20 PROBLEM — K44.9 HIATAL HERNIA: Status: ACTIVE | Noted: 2020-07-21

## 2023-10-20 LAB
ALBUMIN SERPL-MCNC: 3 G/DL (ref 3.5–5)
ANION GAP SERPL CALC-SCNC: 5 MMOL/L (ref 5–15)
BASOPHILS # BLD: 0 K/UL (ref 0–0.1)
BASOPHILS NFR BLD: 0 % (ref 0–1)
BUN SERPL-MCNC: 11 MG/DL (ref 6–20)
BUN/CREAT SERPL: 16 (ref 12–20)
CALCIUM SERPL-MCNC: 8.2 MG/DL (ref 8.5–10.1)
CHLORIDE SERPL-SCNC: 110 MMOL/L (ref 97–108)
CO2 SERPL-SCNC: 28 MMOL/L (ref 21–32)
CREAT SERPL-MCNC: 0.68 MG/DL (ref 0.55–1.02)
DIFFERENTIAL METHOD BLD: ABNORMAL
EKG ATRIAL RATE: 66 BPM
EKG DIAGNOSIS: NORMAL
EKG P AXIS: 66 DEGREES
EKG P-R INTERVAL: 154 MS
EKG Q-T INTERVAL: 426 MS
EKG QRS DURATION: 100 MS
EKG QTC CALCULATION (BAZETT): 446 MS
EKG R AXIS: 24 DEGREES
EKG T AXIS: 154 DEGREES
EKG VENTRICULAR RATE: 66 BPM
EOSINOPHIL # BLD: 0 K/UL (ref 0–0.4)
EOSINOPHIL NFR BLD: 0 % (ref 0–7)
ERYTHROCYTE [DISTWIDTH] IN BLOOD BY AUTOMATED COUNT: 13.3 % (ref 11.5–14.5)
EST. AVERAGE GLUCOSE BLD GHB EST-MCNC: 143 MG/DL
GLUCOSE BLD STRIP.AUTO-MCNC: 107 MG/DL (ref 65–117)
GLUCOSE BLD STRIP.AUTO-MCNC: 108 MG/DL (ref 65–117)
GLUCOSE BLD STRIP.AUTO-MCNC: 173 MG/DL (ref 65–117)
GLUCOSE BLD STRIP.AUTO-MCNC: 177 MG/DL (ref 65–117)
GLUCOSE BLD STRIP.AUTO-MCNC: 94 MG/DL (ref 65–117)
GLUCOSE SERPL-MCNC: 96 MG/DL (ref 65–100)
HBA1C MFR BLD: 6.6 % (ref 4–5.6)
HCT VFR BLD AUTO: 37.2 % (ref 35–47)
HGB BLD-MCNC: 12 G/DL (ref 11.5–16)
IMM GRANULOCYTES # BLD AUTO: 0.1 K/UL (ref 0–0.04)
IMM GRANULOCYTES NFR BLD AUTO: 1 % (ref 0–0.5)
LYMPHOCYTES # BLD: 1.7 K/UL (ref 0.8–3.5)
LYMPHOCYTES NFR BLD: 9 % (ref 12–49)
MAGNESIUM SERPL-MCNC: 1.4 MG/DL (ref 1.6–2.4)
MCH RBC QN AUTO: 28.4 PG (ref 26–34)
MCHC RBC AUTO-ENTMCNC: 32.3 G/DL (ref 30–36.5)
MCV RBC AUTO: 88.2 FL (ref 80–99)
MONOCYTES # BLD: 0.9 K/UL (ref 0–1)
MONOCYTES NFR BLD: 5 % (ref 5–13)
NEUTS SEG # BLD: 15.4 K/UL (ref 1.8–8)
NEUTS SEG NFR BLD: 85 % (ref 32–75)
NRBC # BLD: 0 K/UL (ref 0–0.01)
NRBC BLD-RTO: 0 PER 100 WBC
PHOSPHATE SERPL-MCNC: 3.8 MG/DL (ref 2.6–4.7)
PLATELET # BLD AUTO: 253 K/UL (ref 150–400)
PMV BLD AUTO: 10.2 FL (ref 8.9–12.9)
POTASSIUM SERPL-SCNC: 3.6 MMOL/L (ref 3.5–5.1)
PROCALCITONIN SERPL-MCNC: 5.24 NG/ML
RBC # BLD AUTO: 4.22 M/UL (ref 3.8–5.2)
SERVICE CMNT-IMP: ABNORMAL
SERVICE CMNT-IMP: ABNORMAL
SERVICE CMNT-IMP: NORMAL
SODIUM SERPL-SCNC: 143 MMOL/L (ref 136–145)
WBC # BLD AUTO: 18.1 K/UL (ref 3.6–11)

## 2023-10-20 PROCEDURE — 82962 GLUCOSE BLOOD TEST: CPT

## 2023-10-20 PROCEDURE — 93010 ELECTROCARDIOGRAM REPORT: CPT | Performed by: INTERNAL MEDICINE

## 2023-10-20 PROCEDURE — 1100000000 HC RM PRIVATE

## 2023-10-20 PROCEDURE — 83735 ASSAY OF MAGNESIUM: CPT

## 2023-10-20 PROCEDURE — 6360000002 HC RX W HCPCS: Performed by: INTERNAL MEDICINE

## 2023-10-20 PROCEDURE — 80069 RENAL FUNCTION PANEL: CPT

## 2023-10-20 PROCEDURE — 83036 HEMOGLOBIN GLYCOSYLATED A1C: CPT

## 2023-10-20 PROCEDURE — 6370000000 HC RX 637 (ALT 250 FOR IP): Performed by: INTERNAL MEDICINE

## 2023-10-20 PROCEDURE — 2580000003 HC RX 258: Performed by: EMERGENCY MEDICINE

## 2023-10-20 PROCEDURE — 2580000003 HC RX 258: Performed by: INTERNAL MEDICINE

## 2023-10-20 PROCEDURE — 36415 COLL VENOUS BLD VENIPUNCTURE: CPT

## 2023-10-20 PROCEDURE — 2500000003 HC RX 250 WO HCPCS: Performed by: INTERNAL MEDICINE

## 2023-10-20 PROCEDURE — A4216 STERILE WATER/SALINE, 10 ML: HCPCS | Performed by: INTERNAL MEDICINE

## 2023-10-20 PROCEDURE — 85025 COMPLETE CBC W/AUTO DIFF WBC: CPT

## 2023-10-20 PROCEDURE — C9113 INJ PANTOPRAZOLE SODIUM, VIA: HCPCS | Performed by: INTERNAL MEDICINE

## 2023-10-20 PROCEDURE — 94761 N-INVAS EAR/PLS OXIMETRY MLT: CPT

## 2023-10-20 PROCEDURE — 84145 PROCALCITONIN (PCT): CPT

## 2023-10-20 PROCEDURE — 87040 BLOOD CULTURE FOR BACTERIA: CPT

## 2023-10-20 RX ORDER — SODIUM CHLORIDE 9 MG/ML
INJECTION, SOLUTION INTRAVENOUS CONTINUOUS
Status: DISCONTINUED | OUTPATIENT
Start: 2023-10-20 | End: 2023-10-21 | Stop reason: HOSPADM

## 2023-10-20 RX ORDER — ACETAMINOPHEN 650 MG/1
650 SUPPOSITORY RECTAL EVERY 6 HOURS PRN
Status: DISCONTINUED | OUTPATIENT
Start: 2023-10-20 | End: 2023-10-21 | Stop reason: HOSPADM

## 2023-10-20 RX ORDER — SODIUM CHLORIDE 9 MG/ML
INJECTION, SOLUTION INTRAVENOUS PRN
Status: DISCONTINUED | OUTPATIENT
Start: 2023-10-20 | End: 2023-10-21 | Stop reason: HOSPADM

## 2023-10-20 RX ORDER — 0.9 % SODIUM CHLORIDE 0.9 %
1000 INTRAVENOUS SOLUTION INTRAVENOUS ONCE
Status: COMPLETED | OUTPATIENT
Start: 2023-10-20 | End: 2023-10-20

## 2023-10-20 RX ORDER — ONDANSETRON 4 MG/1
4 TABLET, ORALLY DISINTEGRATING ORAL EVERY 8 HOURS PRN
Status: DISCONTINUED | OUTPATIENT
Start: 2023-10-20 | End: 2023-10-21 | Stop reason: HOSPADM

## 2023-10-20 RX ORDER — POLYETHYLENE GLYCOL 3350 17 G/17G
17 POWDER, FOR SOLUTION ORAL DAILY PRN
Status: DISCONTINUED | OUTPATIENT
Start: 2023-10-20 | End: 2023-10-21 | Stop reason: HOSPADM

## 2023-10-20 RX ORDER — ATENOLOL 25 MG/1
25 TABLET ORAL DAILY
Status: DISCONTINUED | OUTPATIENT
Start: 2023-10-20 | End: 2023-10-21 | Stop reason: HOSPADM

## 2023-10-20 RX ORDER — INSULIN LISPRO 100 [IU]/ML
0-4 INJECTION, SOLUTION INTRAVENOUS; SUBCUTANEOUS NIGHTLY
Status: DISCONTINUED | OUTPATIENT
Start: 2023-10-20 | End: 2023-10-21 | Stop reason: HOSPADM

## 2023-10-20 RX ORDER — AMLODIPINE BESYLATE 5 MG/1
5 TABLET ORAL DAILY
Status: DISCONTINUED | OUTPATIENT
Start: 2023-10-20 | End: 2023-10-21 | Stop reason: HOSPADM

## 2023-10-20 RX ORDER — CHOLECALCIFEROL (VITAMIN D3) 125 MCG
1000 CAPSULE ORAL DAILY
Status: DISCONTINUED | OUTPATIENT
Start: 2023-10-20 | End: 2023-10-21 | Stop reason: HOSPADM

## 2023-10-20 RX ORDER — HYDROCHLOROTHIAZIDE 25 MG/1
25 TABLET ORAL DAILY
Status: DISCONTINUED | OUTPATIENT
Start: 2023-10-20 | End: 2023-10-21 | Stop reason: HOSPADM

## 2023-10-20 RX ORDER — DEXTROSE MONOHYDRATE 100 MG/ML
INJECTION, SOLUTION INTRAVENOUS CONTINUOUS PRN
Status: DISCONTINUED | OUTPATIENT
Start: 2023-10-20 | End: 2023-10-21 | Stop reason: HOSPADM

## 2023-10-20 RX ORDER — ACETAMINOPHEN 325 MG/1
650 TABLET ORAL EVERY 6 HOURS PRN
Status: DISCONTINUED | OUTPATIENT
Start: 2023-10-20 | End: 2023-10-21 | Stop reason: HOSPADM

## 2023-10-20 RX ORDER — INSULIN LISPRO 100 [IU]/ML
0-8 INJECTION, SOLUTION INTRAVENOUS; SUBCUTANEOUS
Status: DISCONTINUED | OUTPATIENT
Start: 2023-10-20 | End: 2023-10-21 | Stop reason: HOSPADM

## 2023-10-20 RX ORDER — 0.9 % SODIUM CHLORIDE 0.9 %
500 INTRAVENOUS SOLUTION INTRAVENOUS ONCE
Status: DISCONTINUED | OUTPATIENT
Start: 2023-10-20 | End: 2023-10-20

## 2023-10-20 RX ORDER — SODIUM CHLORIDE 0.9 % (FLUSH) 0.9 %
5-40 SYRINGE (ML) INJECTION EVERY 12 HOURS SCHEDULED
Status: DISCONTINUED | OUTPATIENT
Start: 2023-10-20 | End: 2023-10-21 | Stop reason: HOSPADM

## 2023-10-20 RX ORDER — ONDANSETRON 2 MG/ML
4 INJECTION INTRAMUSCULAR; INTRAVENOUS EVERY 6 HOURS PRN
Status: DISCONTINUED | OUTPATIENT
Start: 2023-10-20 | End: 2023-10-21 | Stop reason: HOSPADM

## 2023-10-20 RX ORDER — SODIUM CHLORIDE 0.9 % (FLUSH) 0.9 %
5-40 SYRINGE (ML) INJECTION PRN
Status: DISCONTINUED | OUTPATIENT
Start: 2023-10-20 | End: 2023-10-21 | Stop reason: HOSPADM

## 2023-10-20 RX ORDER — AMLODIPINE BESYLATE AND BENAZEPRIL HYDROCHLORIDE 5; 20 MG/1; MG/1
1 CAPSULE ORAL DAILY
Status: DISCONTINUED | OUTPATIENT
Start: 2023-10-20 | End: 2023-10-20

## 2023-10-20 RX ORDER — INSULIN LISPRO 100 [IU]/ML
0-4 INJECTION, SOLUTION INTRAVENOUS; SUBCUTANEOUS EVERY 4 HOURS
Status: DISCONTINUED | OUTPATIENT
Start: 2023-10-20 | End: 2023-10-20

## 2023-10-20 RX ORDER — LISINOPRIL 20 MG/1
20 TABLET ORAL DAILY
Status: DISCONTINUED | OUTPATIENT
Start: 2023-10-20 | End: 2023-10-21 | Stop reason: HOSPADM

## 2023-10-20 RX ADMIN — CYANOCOBALAMIN TAB 500 MCG 1000 MCG: 500 TAB at 12:06

## 2023-10-20 RX ADMIN — SODIUM CHLORIDE 1000 ML: 9 INJECTION, SOLUTION INTRAVENOUS at 01:03

## 2023-10-20 RX ADMIN — ATENOLOL 25 MG: 25 TABLET ORAL at 12:05

## 2023-10-20 RX ADMIN — DOXYCYCLINE 100 MG: 100 INJECTION, POWDER, LYOPHILIZED, FOR SOLUTION INTRAVENOUS at 00:30

## 2023-10-20 RX ADMIN — LISINOPRIL 20 MG: 20 TABLET ORAL at 12:05

## 2023-10-20 RX ADMIN — PIPERACILLIN AND TAZOBACTAM 4500 MG: 4; .5 INJECTION, POWDER, LYOPHILIZED, FOR SOLUTION INTRAVENOUS at 00:29

## 2023-10-20 RX ADMIN — SODIUM CHLORIDE: 9 INJECTION, SOLUTION INTRAVENOUS at 02:17

## 2023-10-20 RX ADMIN — DOXYCYCLINE 100 MG: 100 INJECTION, POWDER, LYOPHILIZED, FOR SOLUTION INTRAVENOUS at 11:12

## 2023-10-20 RX ADMIN — PIPERACILLIN AND TAZOBACTAM 3375 MG: 3; .375 INJECTION, POWDER, LYOPHILIZED, FOR SOLUTION INTRAVENOUS at 13:27

## 2023-10-20 RX ADMIN — SODIUM CHLORIDE: 9 INJECTION, SOLUTION INTRAVENOUS at 22:04

## 2023-10-20 RX ADMIN — PIPERACILLIN AND TAZOBACTAM 3375 MG: 3; .375 INJECTION, POWDER, LYOPHILIZED, FOR SOLUTION INTRAVENOUS at 22:03

## 2023-10-20 RX ADMIN — PIPERACILLIN AND TAZOBACTAM 3375 MG: 3; .375 INJECTION, POWDER, LYOPHILIZED, FOR SOLUTION INTRAVENOUS at 06:10

## 2023-10-20 RX ADMIN — AMLODIPINE BESYLATE 5 MG: 5 TABLET ORAL at 12:05

## 2023-10-20 RX ADMIN — SODIUM CHLORIDE, PRESERVATIVE FREE 10 ML: 5 INJECTION INTRAVENOUS at 22:04

## 2023-10-20 RX ADMIN — PANTOPRAZOLE SODIUM 40 MG: 40 INJECTION, POWDER, FOR SOLUTION INTRAVENOUS at 08:19

## 2023-10-20 RX ADMIN — HYDROCHLOROTHIAZIDE 25 MG: 25 TABLET ORAL at 12:06

## 2023-10-20 ASSESSMENT — PAIN - FUNCTIONAL ASSESSMENT: PAIN_FUNCTIONAL_ASSESSMENT: NONE - DENIES PAIN

## 2023-10-20 NOTE — ACP (ADVANCE CARE PLANNING)
Advance Care Planning     Advance Care Planning Inpatient Note  Spiritual Care Department    Today's Date: 10/20/2023  Unit: [facility] MULTI-SPECIALTY ORTHOPEDICS 1    Received request from IDT Member. Upon review of chart and communication with care team, patient's decision making abilities are not in question. . Patient, Child/Children, and   was/were present in the room during visit. Goals of ACP Conversation:  Unknown    Health Care Decision Makers:     No decision maker has been made now. Summary:    Advance Care Planning Documents (Patient Wishes):  None     Assessment:  Chart reviewed. I received spiritual consult which requested an Advance Medical Directive (AMD) to be completed for the patient. I followed up with patient Alicia Barnett on POST SURG for an AMD. Patient was laying in bed while her family and her  was at bedside. The patient is of the Oriental orthodox daron tradition and attends [employer]. The patient requested that I leave the AMD with her. The patient wants to look over it before completing it. I left the AMD paperwork with Patient Santos Sesay. I also provided spiritual support and prayer for the patient while family were at bedside. The family were grateful for the visit and thankful that I dropped of AMD paperwork with them. I will remain available as needed for the patient. Contact spiritual care for further assistance if needed.      Interventions:  Provided education on documents for clarity and greater understanding    Care Preferences Communicated:   N/A    Outcomes/Plan:  ACP Discussion: Postponed    Electronically signed by Chaplain Amber on 10/20/2023 at 11:36 AM

## 2023-10-20 NOTE — ED NOTES
Call placed to [facility] 4th floor and spoke with [facility] EMERGENCY [facility] CENTER RN. SBAR, MAR, RESULTS and current condition discussed with nurse. All questions answered. Notified that patient currently has fluids and IV ABX running which will be paused and need to be resumed once arrives on floor.       Silvia Davis RN  10/20/23 9603

## 2023-10-20 NOTE — CARE COORDINATION
10/20/23 1048   Service Assessment   Patient Orientation Alert and Oriented   Cognition Alert   History Provided By Patient   Primary [facility]   PCP Verified by CM Yes   Last Visit to PCP Within last 3 months   Prior Functional Level Independent in ADLs/IADLs   Current Functional Level Independent in ADLs/IADLs   Can patient return to prior living arrangement Yes   Ability to make needs known: Good   Family able to assist with home care needs: Yes   Financial Resources Medicaid   Social/Functional History   Lives With Family  (Lives with dtr and granddaughter)   Home Layout One level   [address] to enter with rails   Entrance Stairs - Number of Steps 3   Receives Help From Family   ADL Assistance Independent   Homemaking Assistance Independent   Ambulation Assistance Independent   Transfer Assistance Independent   Active  Yes   Mode of Transportation Car   Occupation Retired   Discharge Planning   Type of [facility]   Patient expects to be discharged to: [employer] One story   History of falls? 0   Services At/After Discharge   Confirm Follow Up Transport Family     Transition of care   RUR 12%  GI Consult   IV abx  Egd  IV protonix  IV anti-emetics  IVF  NPO    NCM met with patient at bedside. Patient reports she lives with her daughter and granddaughter who provide support when needed. Patient reports independence prior to admission with no DME needs. Family to transport when medically ready. No Cm needs at this time.   Mikki Siu

## 2023-10-20 NOTE — PROGRESS NOTES
Hospitalist Progress Note  Lety Lew MD  Answering service: [phone] from in house phone        Date of Service:  10/20/2023  NAME:  Rukhsana Brooks  :  1950  MRN:  [id number]      Admission Summary/HPI:   Ms. Lyn Dunlap is a 67 y.o. female who is being admitted for a suspected aspiration pneumonia. Ms. Lyn Dunlap presented to our [facility] Emergency Department today complaining of a persistent episodes of non bloody vomiting. She has a hx of a hiatal hernia for which she had a hernia repair with mesh. She had recently been experiencing acid reflux symptoms with dysphagia for which she had an EGD earlier yesterday. This was done by Dr Finn Moreno of the [facility]. The EGD was normal other than some retained secretions in the distal esophagus, a normal post op anatomy other than the fundoplication wrap that appeared somewhat tight. After she arrived home, she started having nausea and vomiting that persisted. No fever or much chest pain. She went to the ED, where a CT scan abdomen and pelvis done showed no acute abnormalities or perforation. CT chest showed  a bibasilar dependent patchy airspace consolidation. She was admitted for further management. Interval history / Subjective:   Discussion with patient and her daughter today they state that the PACU told them they were having trouble keeping her oxygen saturation up postoperatively. However, they got her saturation of to a goal range, and discharged her. She then began to have severe nausea and vomiting, unlike previously. She began to have worsening cough and dyspnea and came in. At this time patient feels much improved, remains on 4 L of oxygen.   She has no complaints     Assessment & Plan:     Acute hypoxia 2/2 aspiration pneumonia POA: suspected aspiration versus CAP, POA: suspected given her clinical circumstances and encounter with this patient and independently examined them on 10/20/2023 as outlined below:          General : alert x 3, awake, no acute distress,   HEENT: PEERL, EOMI, moist mucus membrane, TM clear  Neck: supple, no JVD, no meningeal signs  Chest: Clear to auscultation bilaterally   CVS: S1 S2 heard, Capillary refill less than 2 seconds  Abd: soft/ non tender, non distended, BS physiological,   Ext: no clubbing, no cyanosis, no edema, brisk 2+ DP pulses  Neuro/Psych: pleasant mood and affect, CN 2-12 grossly intact, sensory grossly within normal limit, Moves all extremities,  Skin: warm            Data Review:    Review and/or order of clinical lab test  Review and/or order of tests in the radiology section of CPT  Review and/or order of tests in the medicine section of CPT  Discussion of test results with performing physician    I have independently reviewed and interpreted patient's lab and all other diagnostic data    Notes reviewed from all clinical/nonclinical/nursing services involved in patient's clinical care. Care coordination discussions were held with appropriate clinical/nonclinical/ nursing providers based on care coordination needs. Radiology: Radiology Reads Reviewed    Labs:     Recent Labs     10/19/23  2011 10/20/23  0957   WBC 20.3* 18.1*   HGB 12.9 12.0   HCT 38.8 37.2    253     Recent Labs     10/19/23  2011 10/20/23  0957    143   K 3.6 3.6   CL 97* 110*   CO2 21* 28   BUN 12 11   MG  --  1.4*   PHOS  --  3.8     Recent Labs     10/19/23  2011   ALT 5*   GLOB 3.7     No results for input(s): \"INR\", \"APTT\" in the last 72 hours. Invalid input(s): \"PTP\"   No results for input(s): \"TIBC\", \"FERR\" in the last 72 hours. Invalid input(s): \"FE\", \"PSAT\"   No results found for: \"FOL\", \"RBCF\"   No results for input(s): \"PH\", \"PCO2\", \"PO2\" in the last 72 hours. No results for input(s): \"CPK\" in the last 72 hours.     Invalid input(s): \"CPKMB\", \"CKNDX\", \"TROIQ\"  No results found

## 2023-10-20 NOTE — PROGRESS NOTES
Spiritual Care Assessment/Progress Note  [facility]    Name: Rukhsana Brooks MRN: [id number]    Age: 67 y.o. Sex: female   Language: English     Date: 10/20/2023            Total Time Calculated: 43 min              Spiritual Assessment begun in [facility] MULTI-SPECIALTY ORTHOPEDICS 1  Service Provided For[de-identified] Patient, Family  Referral/Consult From[de-identified] Multi-disciplinary team  Encounter Overview/Reason : Advance Care Planning    Spiritual beliefs:      [x] Involved in a daron tradition/spiritual practice: Bahai     [] Supported by a daron community:      [] Claims no spiritual orientation:      [] Seeking spiritual identity:           [] Adheres to an individual form of spirituality:      [] Not able to assess:                Identified resources for coping and support system:   Support System: Children, Friends/neighbors ()       [x] Prayer                  [] Devotional reading               [] Music                  [] Guided Imagery     [] Pet visits                                        [] Other: (COMMENT)     Specific area/focus of visit   Encounter:    Crisis:    Spiritual/Emotional needs:    Ritual, Rites and Sacraments:    Grief, Loss, and Adjustments:    Ethics/Mediation:    Behavioral Health:    Palliative Care: Advance Care Planning: Type: ACP conversation    Plan/Referrals:  (Contact spiritual care if further assistance is needed.)    Narrative:  Chart reviewed. I received spiritual consult which requested an Advance Medical Directive (AMD) to be completed for the patient. I followed up with patient Elicia Jones on Specialty Orthopedics floor for an AMD. Patient was laying in bed while her family and her  was at bedside. The patient is of the Bahai daron tradition and attends [employer]. The patient requested that I leave the AMD with her. The patient wants to look over it before completing it. I left the AMD paperwork with Patient Lyn Dunlap.  I

## 2023-10-20 NOTE — ED NOTES
H here to transport patient to [facility] 4th floor. Report and paperwork provided to transport team. All questions answered. All personal belongings taken by patients family upon departure. Family notified of room number for [facility]. All family questions answered prior to departure.       Prema So RN  10/20/23 0392

## 2023-10-21 VITALS
HEART RATE: 59 BPM | OXYGEN SATURATION: 96 % | TEMPERATURE: 98.7 F | HEIGHT: 64 IN | SYSTOLIC BLOOD PRESSURE: 122 MMHG | WEIGHT: 160 LBS | RESPIRATION RATE: 17 BRPM | DIASTOLIC BLOOD PRESSURE: 59 MMHG | BODY MASS INDEX: 27.31 KG/M2

## 2023-10-21 LAB
ALBUMIN SERPL-MCNC: 2.7 G/DL (ref 3.5–5)
ALBUMIN/GLOB SERPL: 0.7 (ref 1.1–2.2)
ALP SERPL-CCNC: 38 U/L (ref 45–117)
ALT SERPL-CCNC: 12 U/L (ref 12–78)
ANION GAP SERPL CALC-SCNC: 5 MMOL/L (ref 5–15)
AST SERPL-CCNC: 14 U/L (ref 15–37)
BASOPHILS # BLD: 0 K/UL (ref 0–0.1)
BASOPHILS NFR BLD: 0 % (ref 0–1)
BILIRUB SERPL-MCNC: 1.3 MG/DL (ref 0.2–1)
BUN SERPL-MCNC: 10 MG/DL (ref 6–20)
BUN/CREAT SERPL: 15 (ref 12–20)
CALCIUM SERPL-MCNC: 8.3 MG/DL (ref 8.5–10.1)
CHLORIDE SERPL-SCNC: 108 MMOL/L (ref 97–108)
CO2 SERPL-SCNC: 28 MMOL/L (ref 21–32)
CREAT SERPL-MCNC: 0.68 MG/DL (ref 0.55–1.02)
DIFFERENTIAL METHOD BLD: ABNORMAL
EOSINOPHIL # BLD: 0.1 K/UL (ref 0–0.4)
EOSINOPHIL NFR BLD: 1 % (ref 0–7)
ERYTHROCYTE [DISTWIDTH] IN BLOOD BY AUTOMATED COUNT: 13.4 % (ref 11.5–14.5)
GLOBULIN SER CALC-MCNC: 3.8 G/DL (ref 2–4)
GLUCOSE BLD STRIP.AUTO-MCNC: 113 MG/DL (ref 65–117)
GLUCOSE BLD STRIP.AUTO-MCNC: 138 MG/DL (ref 65–117)
GLUCOSE BLD STRIP.AUTO-MCNC: 195 MG/DL (ref 65–117)
GLUCOSE SERPL-MCNC: 108 MG/DL (ref 65–100)
HCT VFR BLD AUTO: 36.1 % (ref 35–47)
HGB BLD-MCNC: 11.5 G/DL (ref 11.5–16)
IMM GRANULOCYTES # BLD AUTO: 0.1 K/UL (ref 0–0.04)
IMM GRANULOCYTES NFR BLD AUTO: 1 % (ref 0–0.5)
LYMPHOCYTES # BLD: 2.1 K/UL (ref 0.8–3.5)
LYMPHOCYTES NFR BLD: 18 % (ref 12–49)
MAGNESIUM SERPL-MCNC: 1.4 MG/DL (ref 1.6–2.4)
MCH RBC QN AUTO: 28.1 PG (ref 26–34)
MCHC RBC AUTO-ENTMCNC: 31.9 G/DL (ref 30–36.5)
MCV RBC AUTO: 88.3 FL (ref 80–99)
MONOCYTES # BLD: 0.7 K/UL (ref 0–1)
MONOCYTES NFR BLD: 6 % (ref 5–13)
NEUTS SEG # BLD: 9.1 K/UL (ref 1.8–8)
NEUTS SEG NFR BLD: 74 % (ref 32–75)
NRBC # BLD: 0 K/UL (ref 0–0.01)
NRBC BLD-RTO: 0 PER 100 WBC
PHOSPHATE SERPL-MCNC: 3.8 MG/DL (ref 2.6–4.7)
PLATELET # BLD AUTO: 235 K/UL (ref 150–400)
PMV BLD AUTO: 10.5 FL (ref 8.9–12.9)
POTASSIUM SERPL-SCNC: 3.6 MMOL/L (ref 3.5–5.1)
PROT SERPL-MCNC: 6.5 G/DL (ref 6.4–8.2)
RBC # BLD AUTO: 4.09 M/UL (ref 3.8–5.2)
SERVICE CMNT-IMP: ABNORMAL
SERVICE CMNT-IMP: ABNORMAL
SERVICE CMNT-IMP: NORMAL
SODIUM SERPL-SCNC: 141 MMOL/L (ref 136–145)
WBC # BLD AUTO: 12.1 K/UL (ref 3.6–11)

## 2023-10-21 PROCEDURE — 94761 N-INVAS EAR/PLS OXIMETRY MLT: CPT

## 2023-10-21 PROCEDURE — 80053 COMPREHEN METABOLIC PANEL: CPT

## 2023-10-21 PROCEDURE — 2580000003 HC RX 258: Performed by: INTERNAL MEDICINE

## 2023-10-21 PROCEDURE — 85025 COMPLETE CBC W/AUTO DIFF WBC: CPT

## 2023-10-21 PROCEDURE — 6360000002 HC RX W HCPCS: Performed by: INTERNAL MEDICINE

## 2023-10-21 PROCEDURE — 2500000003 HC RX 250 WO HCPCS: Performed by: INTERNAL MEDICINE

## 2023-10-21 PROCEDURE — 84100 ASSAY OF PHOSPHORUS: CPT

## 2023-10-21 PROCEDURE — 36415 COLL VENOUS BLD VENIPUNCTURE: CPT

## 2023-10-21 PROCEDURE — 6370000000 HC RX 637 (ALT 250 FOR IP): Performed by: INTERNAL MEDICINE

## 2023-10-21 PROCEDURE — A4216 STERILE WATER/SALINE, 10 ML: HCPCS | Performed by: INTERNAL MEDICINE

## 2023-10-21 PROCEDURE — 97161 PT EVAL LOW COMPLEX 20 MIN: CPT

## 2023-10-21 PROCEDURE — 83735 ASSAY OF MAGNESIUM: CPT

## 2023-10-21 PROCEDURE — 82962 GLUCOSE BLOOD TEST: CPT

## 2023-10-21 PROCEDURE — C9113 INJ PANTOPRAZOLE SODIUM, VIA: HCPCS | Performed by: INTERNAL MEDICINE

## 2023-10-21 RX ORDER — DOXYCYCLINE HYCLATE 100 MG
100 TABLET ORAL 2 TIMES DAILY
Qty: 14 TABLET | Refills: 0 | Status: SHIPPED | OUTPATIENT
Start: 2023-10-21 | End: 2023-10-28

## 2023-10-21 RX ORDER — AMOXICILLIN AND CLAVULANATE POTASSIUM 875; 125 MG/1; MG/1
1 TABLET, FILM COATED ORAL 2 TIMES DAILY
Qty: 14 TABLET | Refills: 0 | Status: SHIPPED | OUTPATIENT
Start: 2023-10-21 | End: 2023-10-28

## 2023-10-21 RX ADMIN — DOXYCYCLINE 100 MG: 100 INJECTION, POWDER, LYOPHILIZED, FOR SOLUTION INTRAVENOUS at 16:12

## 2023-10-21 RX ADMIN — LISINOPRIL 20 MG: 20 TABLET ORAL at 09:27

## 2023-10-21 RX ADMIN — SODIUM CHLORIDE, PRESERVATIVE FREE 10 ML: 5 INJECTION INTRAVENOUS at 09:24

## 2023-10-21 RX ADMIN — PANTOPRAZOLE SODIUM 40 MG: 40 INJECTION, POWDER, FOR SOLUTION INTRAVENOUS at 09:27

## 2023-10-21 RX ADMIN — CYANOCOBALAMIN TAB 500 MCG 1000 MCG: 500 TAB at 09:24

## 2023-10-21 RX ADMIN — HYDROCHLOROTHIAZIDE 25 MG: 25 TABLET ORAL at 09:27

## 2023-10-21 RX ADMIN — AMLODIPINE BESYLATE 5 MG: 5 TABLET ORAL at 09:24

## 2023-10-21 RX ADMIN — DOXYCYCLINE 100 MG: 100 INJECTION, POWDER, LYOPHILIZED, FOR SOLUTION INTRAVENOUS at 02:54

## 2023-10-21 RX ADMIN — PIPERACILLIN AND TAZOBACTAM 3375 MG: 3; .375 INJECTION, POWDER, LYOPHILIZED, FOR SOLUTION INTRAVENOUS at 06:06

## 2023-10-21 NOTE — PLAN OF CARE
Problem: Discharge Planning  Goal: Discharge to home or other facility with appropriate resources  Outcome: [facility] 114 Progressing     Problem: Chronic Conditions and Co-morbidities  Goal: Patient's chronic conditions and co-morbidity symptoms are monitored and maintained or improved  Outcome: [facility] 114 Progressing     Problem: Safety - Adult  Goal: Free from fall injury  Outcome: [address] [place] [address] 114 Progressing

## 2023-10-21 NOTE — DISCHARGE SUMMARY
Hospitalist Discharge Summary     Patient ID:  Pankaj Mckay  [id number]  52 y.o.  1950    Admit date: 10/19/2023    Discharge date and time: 10/21/2023    Admission Diagnoses: Aspiration pneumonia of both lower lobes due to gastric secretions ([address]) [J69.0]    Discharge Diagnoses:    Principal Problem:    Aspiration pneumonia ([address])  Active Problems:    Hiatal hernia    DM (diabetes mellitus), type 2 ([address])    HTN (hypertension), benign  Resolved Problems:    * No resolved hospital problems. [facility] FACILITY Course:   Acute hypoxia 2/2 aspiration pneumonia POA: suspected aspiration versus CAP. Likely aspiration during recent EGD. Improved with abx. Passed O2 eval.  - Discharge with doxy and augmentin to cover CAP, and aspiration  - Repeat chest imaging to make sure resolved on imaging    Persistent vomiting / hx Hiatal hernia POA: she had an EGD the day before admission and her symptoms are likely related to the anaesthesia. Resolved. - Outpt follow up     DM (diabetes mellitus), type 2 POA. Controlled based on A1c of 6.6  - No change to home meds. HTN (hypertension), benign POA  - No change to meds    Imaging  CT CHEST WO CONTRAST    Result Date: 10/19/2023  Bibasilar dependent patchy airspace consolidation. XR CHEST PORTABLE    Result Date: 10/19/2023  Mild pulmonary interstitial edema. CT ABDOMEN PELVIS W IV CONTRAST Additional Contrast? None    Result Date: 10/19/2023  No acute abnormalities or evidence of bowel perforation. Incidentals as above including coronary artery disease.        PCP: Anthony Hudson MD     Consults: none    Condition of patient at discharge: Improved    Discharge Exam:    Physical Exam:    Gen: Well-developed, well-nourished, in no acute distress  HEENT:  Pink conjunctivae, PERRL, hearing intact to voice, moist mucous membranes  Neck: Supple, without masses, thyroid non-tender  Resp: No accessory muscle use, clear breath sounds without wheezes rales or rhonchi  Card: No murmurs, normal S1, S2 without thrills, bruits or peripheral edema  Abd:  Soft, non-tender, non-distended, normoactive bowel sounds are present, no palpable organomegaly and no detectable hernias  Lymph:  No cervical or inguinal adenopathy  Musc: No cyanosis or clubbing  Skin: No rashes or ulcers, skin turgor is good  Neuro:  Cranial nerves are grossly intact, no focal motor weakness, follows commands appropriately  Psych:  Good insight, oriented to person, place and time, alert          Disposition: home    Patient Instructions:   Current Discharge Medication List        START taking these medications    Details   amoxicillin-clavulanate (AUGMENTIN) 875-125 MG per tablet Take 1 tablet by mouth 2 times daily for 7 days  Qty: 14 tablet, Refills: 0      doxycycline hyclate (VIBRA-TABS) 100 MG tablet Take 1 tablet by mouth 2 times daily for 7 days  Qty: 14 tablet, Refills: 0           CONTINUE these medications which have NOT CHANGED    Details   Glucose Blood (ACCU-CHEK OWEN PLUS VI) as directed (E11.9) In Vitro once daily for 90 days      cyanocobalamin ([employer] VITAMIN B12) 1000 MCG tablet Take 1 tablet by mouth daily      atenolol (TENORMIN) 25 MG tablet Take 1 tablet by mouth daily      acetaminophen (TYLENOL) 500 MG tablet Take 1 tablet by mouth as needed      amLODIPine-benazepril (LOTREL) 5-20 MG per capsule Take 1 capsule by mouth daily      hydroCHLOROthiazide (HYDRODIURIL) 25 MG tablet Take 1 tablet by mouth daily      SITagliptin-metFORMIN HCl ER (JANUMET XR) 100-1000 MG TB24 Take 1 tablet by mouth every evening           STOP taking these medications       meclizine (ANTIVERT) 25 MG CHEW Comments:   Reason for Stopping:         traMADol (ULTRAM) 50 MG tablet Comments:   Reason for Stopping:             Activity: activity as tolerated  Diet: diabetic diet  Wound Care: none needed    Follow-up with Ricardo Oliver MD in 3 days.   Follow-up tests/labs CBC, CMP    Approximate time spent in patient

## 2023-10-21 NOTE — CARE COORDINATION
Transition of Care Plan:    RUR: 12%  Prior Level of Functioning: independent  Disposition: contingent upon evaluation for home oxygen and PT evaluation    Per attending:  Pt may be discharged today:  If she is no longer vomiting  After PT evaluation and home oxygen evaluation    Nurse:Please notify me @ [phone] or perfect serve if pt meets criteria today for discharge,.     Don Barton

## 2023-10-21 NOTE — CARE COORDINATION
Transition of Care Plan:    RUR: 10%  Prior Level of Functioning: independent  Disposition: home with outpatient follow-up  If SNF or IPR: Date FOC offered:       Plan:  Discharge home today  Pt did not meet criteria for home oxygen per walking oximetry. Pt does not meet criteria for home health,rehab or DME.     Salinas Cornejo

## 2023-10-21 NOTE — DISCHARGE INSTRUCTIONS
HOSPITALIST DISCHARGE INSTRUCTIONS  NAME:  Pat Choudhary   :  1950   MRN:  [id number]     Date/Time:  10/21/2023 3:33 PM    ADMIT DATE: 10/19/2023     DISCHARGE DATE: 10/21/2023     DISCHARGE DIAGNOSIS:  Respiratory failure after suspected aspiration during endoscopy    DISCHARGE INSTRUCTIONS:  Thank you for allowing us to participate in your care. Your discharging Hospitalist is Jessie Hammond MD. Daniel Hernandez were admitted for evaluation and treatment of the above. You were given antibiotics and oxygen during your hospital stay and your symptoms improved. Please follow up with your PCP. Please continue taking oral antibiotics to finish up your treatment course (see medications section of this packet). MEDICATIONS:    It is important that you take the medication exactly as they are prescribed. Keep your medication in the bottles provided by the pharmacist and keep a list of the medication names, dosages, and times to be taken in your wallet. Do not take other medications without consulting your doctor. If you experience any of the following symptoms then please call your primary care physician or return to the emergency room if you cannot get hold of your doctor:  Fever, chills, nausea, vomiting, diarrhea, change in mentation, falling, bleeding, shortness of breath    Follow Up:  Please call the below provider to arrange hospital follow up appointment      [address] [place] [address], Haroldo Fernandez, [address] [facility]  [phone]    Schedule an appointment as soon as possible for a visit          Information obtained by :  I understand that if any problems occur once I am at home I am to contact my physician. I understand and acknowledge receipt of the instructions indicated above.                                                                                                                                            Physician's or R.N.'s Signature

## 2023-10-22 LAB
BACTERIA SPEC CULT: NORMAL
BACTERIA SPEC CULT: NORMAL
SERVICE CMNT-IMP: NORMAL
SERVICE CMNT-IMP: NORMAL

## 2023-11-04 NOTE — PROGRESS NOTES
Physician Progress Note      PATIENT:               Lisa Freire  CSN #:                  [id number]  :                       1950  ADMIT DATE:       10/19/2023 7:32 PM  [facility] DATE:        10/21/2023 7:00 PM  RESPONDING  PROVIDER #:        Brandie Holt MD          QUERY TEXT:    Pt admitted with suspected aspiration pna vs CAP. Pt noted to have recent EGD   with DCS noting \"likely aspiration during recent EGD. \" If possible, please   document in progress notes and discharge summary the relationship, if any,   between suspected aspiration pna and EGD. The medical record reflects the following:  Risk Factors: advanced age, h/o hiatal hernia, recent EGD  Clinical Indicators: pt to ED with N/V after endoscopy procedure earlier that   day  - H&P notes patient admitted for a suspected aspiration pneumonia  - H&P notes: \"Persistent vomiting / hx Hiatal hernia POA: she had an EGD   yesterday and her symptoms are likely related to the anesthesia. \"  - 10/20 PN- suspected aspiration versus CAP  - DCS notes admission dx- Aspiration pneumonia of both lower lobes due to   gastric secretions  - DCS notes: Acute hypoxia 2/2 aspiration pneumonia? POA: suspected?aspiration   versus CAP. Likely aspiration during recent EGD. Improved with abx. Treatment: doxycycline IV, zosyn IV, zofran IV, protonix IV, discharge with   doxycycline and augmentin x 7 days    Thank you,    Sherie aWlden RN  CDI  Options provided:  -- Suspected aspiration pneumonia due to EGD  -- Suspected aspiration pneumonia unrelated to EGD  -- Other - I will add my own diagnosis  -- Disagree - Not applicable / Not valid  -- Disagree - Clinically unable to determine / Unknown  -- Refer to Clinical Documentation Reviewer    PROVIDER RESPONSE TEXT:    This patient has suspected aspiration pneumonia due to EGD.     Query created by: Sherie Walden on 11/3/2023 11:49 AM      Electronically signed by:  Brandie Holt MD 2023 7:16 AM

## 2025-02-02 ENCOUNTER — APPOINTMENT (OUTPATIENT)
Facility: HOSPITAL | Age: 75
End: 2025-02-02
Payer: MEDICARE

## 2025-02-02 ENCOUNTER — HOSPITAL ENCOUNTER (EMERGENCY)
Facility: HOSPITAL | Age: 75
Discharge: HOME | End: 2025-02-02
Attending: STUDENT IN AN ORGANIZED HEALTH CARE EDUCATION/TRAINING PROGRAM
Payer: MEDICARE

## 2025-02-02 VITALS
HEIGHT: 62 IN | DIASTOLIC BLOOD PRESSURE: 59 MMHG | HEART RATE: 95 BPM | TEMPERATURE: 99.3 F | BODY MASS INDEX: 29.26 KG/M2 | OXYGEN SATURATION: 91 % | RESPIRATION RATE: 18 BRPM | SYSTOLIC BLOOD PRESSURE: 151 MMHG

## 2025-02-02 DIAGNOSIS — L02.31 GLUTEAL ABSCESS: Primary | ICD-10-CM

## 2025-02-02 LAB
ALBUMIN SERPL-MCNC: 3.8 G/DL (ref 3.5–5.2)
ALBUMIN/GLOB SERPL: 0.9 (ref 1.1–2.2)
ALP SERPL-CCNC: 75 U/L (ref 35–104)
ALT SERPL-CCNC: 8 U/L (ref 10–35)
ANION GAP SERPL CALC-SCNC: 13 MMOL/L (ref 2–12)
AST SERPL-CCNC: 25 U/L (ref 10–35)
BASOPHILS # BLD: 0.01 K/UL (ref 0–0.1)
BASOPHILS NFR BLD: 0.1 % (ref 0–1)
BILIRUB SERPL-MCNC: 1 MG/DL (ref 0.2–1)
BUN SERPL-MCNC: 10 MG/DL (ref 8–23)
BUN/CREAT SERPL: 21 (ref 12–20)
CALCIUM SERPL-MCNC: 9.4 MG/DL (ref 8.8–10.2)
CHLORIDE SERPL-SCNC: 99 MMOL/L (ref 98–107)
CO2 SERPL-SCNC: 24 MMOL/L (ref 22–29)
CREAT SERPL-MCNC: 0.48 MG/DL (ref 0.5–0.9)
DIFFERENTIAL METHOD BLD: ABNORMAL
EOSINOPHIL # BLD: 0.04 K/UL (ref 0–0.4)
EOSINOPHIL NFR BLD: 0.4 % (ref 0–7)
ERYTHROCYTE [DISTWIDTH] IN BLOOD BY AUTOMATED COUNT: 12.9 % (ref 11.5–14.5)
GLOBULIN SER CALC-MCNC: 4.1 G/DL (ref 2–4)
GLUCOSE SERPL-MCNC: 191 MG/DL (ref 65–100)
HCT VFR BLD AUTO: 41.8 % (ref 35–47)
HGB BLD-MCNC: 14 G/DL (ref 11.5–16)
IMM GRANULOCYTES # BLD AUTO: 0.05 K/UL (ref 0–0.04)
IMM GRANULOCYTES NFR BLD AUTO: 0.6 % (ref 0–0.5)
LYMPHOCYTES # BLD: 1.26 K/UL (ref 0.8–3.5)
LYMPHOCYTES NFR BLD: 14.2 % (ref 12–49)
MCH RBC QN AUTO: 28.9 PG (ref 26–34)
MCHC RBC AUTO-ENTMCNC: 33.5 G/DL (ref 30–36.5)
MCV RBC AUTO: 86.2 FL (ref 80–99)
MONOCYTES # BLD: 0.9 K/UL (ref 0–1)
MONOCYTES NFR BLD: 10.1 % (ref 5–13)
NEUTS SEG # BLD: 6.64 K/UL (ref 1.8–8)
NEUTS SEG NFR BLD: 74.6 % (ref 32–75)
NRBC # BLD: 0 K/UL (ref 0–0.01)
NRBC BLD-RTO: 0 PER 100 WBC
PLATELET # BLD AUTO: 269 K/UL (ref 150–400)
PMV BLD AUTO: 10.4 FL (ref 8.9–12.9)
POTASSIUM SERPL-SCNC: 4.4 MMOL/L (ref 3.5–5.1)
PROT SERPL-MCNC: 7.9 G/DL (ref 6.4–8.3)
RBC # BLD AUTO: 4.85 M/UL (ref 3.8–5.2)
SODIUM SERPL-SCNC: 136 MMOL/L (ref 136–145)
WBC # BLD AUTO: 8.9 K/UL (ref 3.6–11)

## 2025-02-02 PROCEDURE — 85025 COMPLETE CBC W/AUTO DIFF WBC: CPT

## 2025-02-02 PROCEDURE — 99285 EMERGENCY DEPT VISIT HI MDM: CPT

## 2025-02-02 PROCEDURE — 80053 COMPREHEN METABOLIC PANEL: CPT

## 2025-02-02 PROCEDURE — 10061 I&D ABSCESS COMP/MULTIPLE: CPT

## 2025-02-02 PROCEDURE — 6360000002 HC RX W HCPCS

## 2025-02-02 PROCEDURE — 6360000004 HC RX CONTRAST MEDICATION: Performed by: STUDENT IN AN ORGANIZED HEALTH CARE EDUCATION/TRAINING PROGRAM

## 2025-02-02 PROCEDURE — 36415 COLL VENOUS BLD VENIPUNCTURE: CPT

## 2025-02-02 PROCEDURE — 72193 CT PELVIS W/DYE: CPT

## 2025-02-02 RX ORDER — TRAMADOL HYDROCHLORIDE 50 MG/1
50 TABLET ORAL EVERY 6 HOURS PRN
Qty: 12 TABLET | Refills: 0 | Status: SHIPPED | OUTPATIENT
Start: 2025-02-02 | End: 2025-02-05

## 2025-02-02 RX ORDER — DOXYCYCLINE HYCLATE 100 MG
100 TABLET ORAL 2 TIMES DAILY
Qty: 14 TABLET | Refills: 0 | Status: SHIPPED | OUTPATIENT
Start: 2025-02-02 | End: 2025-02-09

## 2025-02-02 RX ORDER — IOPAMIDOL 755 MG/ML
100 INJECTION, SOLUTION INTRAVASCULAR
Status: COMPLETED | OUTPATIENT
Start: 2025-02-02 | End: 2025-02-02

## 2025-02-02 RX ORDER — LIDOCAINE HYDROCHLORIDE AND EPINEPHRINE 15; 5 MG/ML; UG/ML
10 INJECTION, SOLUTION EPIDURAL ONCE
Status: COMPLETED | OUTPATIENT
Start: 2025-02-02 | End: 2025-02-02

## 2025-02-02 RX ADMIN — LIDOCAINE HYDROCHLORIDE,EPINEPHRINE BITARTRATE 10 ML: 15; .005 INJECTION, SOLUTION EPIDURAL; INFILTRATION; INTRACAUDAL; PERINEURAL at 19:04

## 2025-02-02 RX ADMIN — IOPAMIDOL 100 ML: 755 INJECTION, SOLUTION INTRAVENOUS at 16:27

## 2025-02-02 ASSESSMENT — PAIN SCALES - GENERAL: PAINLEVEL_OUTOF10: 9

## 2025-02-02 ASSESSMENT — PAIN DESCRIPTION - LOCATION: LOCATION: BUTTOCKS

## 2025-02-02 ASSESSMENT — PAIN - FUNCTIONAL ASSESSMENT: PAIN_FUNCTIONAL_ASSESSMENT: 0-10

## 2025-02-02 NOTE — ED TRIAGE NOTES
Pt ambulatory to ED w/ c/o abscess to left buttocks x few days. Pt denies drainage. Pt endorses pain when sitting.

## 2025-02-02 NOTE — DISCHARGE INSTRUCTIONS
You are seen in the emergency department for swelling in your buttock, your CT shows evidence of a perianal abscess, this has been drained in the emergency department with incision and drainage.  You have been given antibiotics for coverage of infection with doxycycline prescribed.  You have been given referral to general surgery Dr. Meeks for follow-up.  Please take Tylenol and Motrin alternating for best pain relief.  Please monitor for worsening symptoms including fevers chills nausea vomiting worsening pain swelling anal pain difficulty with bowel movements or any other new or concerning symptoms that would warrant further emergent reevaluation in the ED.   Please remove the packing tomorrow and shower afterwards as you normally would.  Please keep the area clean and dry.  Please call Dr. Meeks tomorrow to set up follow-up closely.    Thank you for letting us take care of you, hope you feel better soon,  Maryse Simental MD        For pain management, both Tylenol and ibuprofen (motrin) can be taken. They have a different chemical composition and may give more relief together than can be provided using either alone.  How to alternate Ibuprofen and Tylenol:  ? With food, You will take a dose of pain medication every three hours.  ? Start by taking 650 mg of Tylenol   ? 3 hours later take 600 mg of Motrin   ? 3 hours after taking the Motrin take 650 mg of Tylenol  ? 3 hours after that take 600 mg of Motrin.   ? You can continue to alternate Ibuprofen and Tylenol, up to the maximum doses of each medicine, but do not exceed the maximum dose of each.  ? Be sure to maintain proper dosing intervals between successive doses of the same medication (at least 4 hours)    Do not take more than 4,000 mg of Tylenol or 3,200 mg of Motrin in a 24-hour period!

## 2025-02-02 NOTE — ED PROVIDER NOTES
[place] EMERGENCY DEPARTMENT  EMERGENCY DEPARTMENT ENCOUNTER      Pt Name: Jun Miranda  MRN: [id number]  Birthdate 1950  Date of evaluation: 2/2/2025  Provider: ADDIE eFliz    CHIEF COMPLAINT       Chief Complaint   Patient presents with    Abscess       HISTORY OF PRESENT ILLNESS    HPI    Nursing notes reviewed.    REVIEW OF SYSTEMS     Review of Systems  Unless otherwise stated, a complete review of systems was asked of the patient. Pertinent positives are noted in the HPI section.    PAST MEDICAL HISTORY     Past Medical History:   Diagnosis Date    Anemia 05/19/2020    8.9    Diabetes (HCC)     DM (diabetes mellitus) (HCC)     GERD (gastroesophageal reflux disease)     Hypertension     Vertigo 02/2020    White coat syndrome with hypertension        SURGICAL HISTORY       Past Surgical History:   Procedure Laterality Date    APPENDECTOMY      COLONOSCOPY  05/2019    HERNIA REPAIR      LITHOTRIPSY  02/2020    OVARY REMOVAL Right        CURRENT MEDICATIONS       Previous Medications    ACETAMINOPHEN (TYLENOL) 500 MG TABLET    Take 1 tablet by mouth as needed    AMLODIPINE-BENAZEPRIL (LOTREL) 5-20 MG PER CAPSULE    Take 1 capsule by mouth daily    ATENOLOL (TENORMIN) 25 MG TABLET    Take 1 tablet by mouth daily    CYANOCOBALAMIN ([employer] VITAMIN B12) 1000 MCG TABLET    Take 1 tablet by mouth daily    GLUCOSE BLOOD (ACCU-CHEK OWEN PLUS VI)    as directed (E11.9) In Vitro once daily for 90 days    HYDROCHLOROTHIAZIDE (HYDRODIURIL) 25 MG TABLET    Take 1 tablet by mouth daily    SITAGLIPTIN-METFORMIN HCL ER (JANUMET XR) 100-1000 MG TB24    Take 1 tablet by mouth every evening       ALLERGIES     Patient has no known allergies.    FAMILY HISTORY       Family History   Problem Relation Age of Onset    Diabetes Son     Deep Vein Thrombosis Neg Hx     Diabetes Mother     Diabetes Other     Anesth Problems Neg Hx         SOCIAL HISTORY       Social History     Socioeconomic History    Marital status:

## 2025-02-03 NOTE — ED NOTES
Discharge medications reviewed with the patient and daughter and appropriate educational materials and side effects teaching were provided.

## 2025-02-05 ENCOUNTER — HOSPITAL ENCOUNTER (EMERGENCY)
Facility: HOSPITAL | Age: 75
Discharge: HOME | End: 2025-02-05
Attending: EMERGENCY MEDICINE
Payer: MEDICARE

## 2025-02-05 VITALS
HEIGHT: 62 IN | BODY MASS INDEX: 27.81 KG/M2 | OXYGEN SATURATION: 94 % | RESPIRATION RATE: 16 BRPM | HEART RATE: 89 BPM | TEMPERATURE: 98.2 F | SYSTOLIC BLOOD PRESSURE: 153 MMHG | WEIGHT: 151.13 LBS | DIASTOLIC BLOOD PRESSURE: 95 MMHG

## 2025-02-05 DIAGNOSIS — Z51.89 WOUND CHECK, ABSCESS: Primary | ICD-10-CM

## 2025-02-05 PROCEDURE — 6370000000 HC RX 637 (ALT 250 FOR IP): Performed by: PHYSICIAN ASSISTANT

## 2025-02-05 PROCEDURE — 99283 EMERGENCY DEPT VISIT LOW MDM: CPT

## 2025-02-05 RX ORDER — NEOMYCIN/BACITRACIN/POLYMYXINB 3.5-400-5K
OINTMENT (GRAM) TOPICAL
Qty: 3.5 G | Refills: 0 | Status: SHIPPED | OUTPATIENT
Start: 2025-02-05 | End: 2025-02-05

## 2025-02-05 RX ORDER — TRAMADOL HYDROCHLORIDE 50 MG/1
50 TABLET ORAL
Status: COMPLETED | OUTPATIENT
Start: 2025-02-05 | End: 2025-02-05

## 2025-02-05 RX ORDER — NEOMYCIN/BACITRACIN/POLYMYXINB 3.5-400-5K
OINTMENT (GRAM) TOPICAL
Qty: 3.5 G | Refills: 1 | Status: SHIPPED | OUTPATIENT
Start: 2025-02-05

## 2025-02-05 RX ADMIN — TRAMADOL HYDROCHLORIDE 50 MG: 50 TABLET ORAL at 10:25

## 2025-02-05 ASSESSMENT — PAIN DESCRIPTION - LOCATION
LOCATION: BUTTOCKS
LOCATION: BUTTOCKS

## 2025-02-05 ASSESSMENT — PAIN SCALES - GENERAL
PAINLEVEL_OUTOF10: 2
PAINLEVEL_OUTOF10: 3

## 2025-02-05 ASSESSMENT — PAIN - FUNCTIONAL ASSESSMENT: PAIN_FUNCTIONAL_ASSESSMENT: 0-10

## 2025-02-05 NOTE — ED TRIAGE NOTES
PT ambulatory to ED with reports of being here Sunday, pt had abscess drained and packed between buttocks and was told to follow up with PCP. PT was not able to get appt and was told to come back here to get the packing removed.

## 2025-02-05 NOTE — DISCHARGE INSTRUCTIONS
Use antibiotic ointment for the next 3 to 5 days.  Continue warm water soaks.  Make sure to call your primary care provider within the next 24 hours for close outpatient follow-up.  As we discussed if you experience any fevers or new or worsening symptoms concerning for infection return to emergency department immediately.  Return immediately if any new or worsening symptoms.  Thank you for allowing us to be a part of your care.

## 2025-02-05 NOTE — ED PROVIDER NOTES
[facility]  EMERGENCY DEPARTMENT ENCOUNTER      Pt Name: Jun Miranda  MRN: [id number]  Birthdate 1950  Date of evaluation: 2/5/2025  Provider: Gian Walker PA-C    CHIEF COMPLAINT       Chief Complaint   Patient presents with    Abscess         HISTORY OF PRESENT ILLNESS   (Location/Symptom, Timing/Onset, Context/Setting, Quality, Duration, Modifying Factors, Severity)  Note limiting factors.   74-year-old F presenting to emergency department to have packing removed from abscess that was incised and drained and packed 5 days ago.  Patient has been taking tramadol for pain control.  She has not taken any tramadol this morning.  Request tramadol prior to removal of packing.  Patient denies fevers            Review of External Medical Records:     Nursing Notes were reviewed.    REVIEW OF SYSTEMS    (2-9 systems for level 4, 10 or more for level 5)     Review of Systems   Constitutional:  Positive for fever.   Skin:  Positive for wound.   All other systems reviewed and are negative.      Except as noted above the remainder of the review of systems was reviewed and negative.       PAST MEDICAL HISTORY     Past Medical History:   Diagnosis Date    Anemia 05/19/2020    8.9    Diabetes (HCC)     DM (diabetes mellitus) (HCC)     GERD (gastroesophageal reflux disease)     Hypertension     Vertigo 02/2020    White coat syndrome with hypertension          SURGICAL HISTORY       Past Surgical History:   Procedure Laterality Date    APPENDECTOMY      COLONOSCOPY  05/2019    HERNIA REPAIR      LITHOTRIPSY  02/2020    OVARY REMOVAL Right          CURRENT MEDICATIONS       Current Discharge Medication List        CONTINUE these medications which have NOT CHANGED    Details   doxycycline hyclate (VIBRA-TABS) 100 MG tablet Take 1 tablet by mouth 2 times daily for 7 days  Qty: 14 tablet, Refills: 0      traMADol (ULTRAM) 50 MG tablet Take 1 tablet by mouth every 6 hours as needed for Pain for up to 3

## 2025-02-12 ENCOUNTER — TELEPHONE (OUTPATIENT)
Age: 75
End: 2025-02-12

## 2025-02-12 NOTE — TELEPHONE ENCOUNTER
----- Message from Dr. Yahaira Meeks MD sent at 2/11/2025  2:30 PM EST -----    ----- Message -----  From: Maryse Simental MD  Sent: 2/3/2025   7:10 AM EST  To: Yahaira Meeks MD

## 2025-02-12 NOTE — TELEPHONE ENCOUNTER
Placed outbound call to patient due to Dr. Meeks being Cc'd on ED chart. Patient did not answer, LVM requesting call back if she would like to proceed with scheduling ED follow up. Provided office contact info in message.

## 2025-08-09 ENCOUNTER — ANCILLARY PROCEDURE (OUTPATIENT)
Facility: HOSPITAL | Age: 75
End: 2025-08-09
Attending: STUDENT IN AN ORGANIZED HEALTH CARE EDUCATION/TRAINING PROGRAM
Payer: MEDICARE

## 2025-08-09 ENCOUNTER — HOSPITAL ENCOUNTER (EMERGENCY)
Facility: HOSPITAL | Age: 75
Discharge: HOME | End: 2025-08-09
Attending: STUDENT IN AN ORGANIZED HEALTH CARE EDUCATION/TRAINING PROGRAM
Payer: MEDICARE

## 2025-08-09 ENCOUNTER — APPOINTMENT (OUTPATIENT)
Facility: HOSPITAL | Age: 75
End: 2025-08-09
Payer: MEDICARE

## 2025-08-09 VITALS
DIASTOLIC BLOOD PRESSURE: 86 MMHG | RESPIRATION RATE: 18 BRPM | HEART RATE: 80 BPM | WEIGHT: 150 LBS | TEMPERATURE: 98.4 F | HEIGHT: 62 IN | SYSTOLIC BLOOD PRESSURE: 185 MMHG | BODY MASS INDEX: 27.6 KG/M2 | OXYGEN SATURATION: 95 %

## 2025-08-09 DIAGNOSIS — M10.9 ACUTE GOUT OF LEFT HAND, UNSPECIFIED CAUSE: Primary | ICD-10-CM

## 2025-08-09 PROCEDURE — 73130 X-RAY EXAM OF HAND: CPT

## 2025-08-09 PROCEDURE — 99284 EMERGENCY DEPT VISIT MOD MDM: CPT

## 2025-08-09 PROCEDURE — 6360000002 HC RX W HCPCS: Performed by: STUDENT IN AN ORGANIZED HEALTH CARE EDUCATION/TRAINING PROGRAM

## 2025-08-09 PROCEDURE — 96372 THER/PROPH/DIAG INJ SC/IM: CPT

## 2025-08-09 RX ORDER — INDOMETHACIN 50 MG/1
50 CAPSULE ORAL 2 TIMES DAILY WITH MEALS
Qty: 14 CAPSULE | Refills: 0 | Status: SHIPPED | OUTPATIENT
Start: 2025-08-09 | End: 2025-08-16

## 2025-08-09 RX ORDER — KETOROLAC TROMETHAMINE 30 MG/ML
30 INJECTION, SOLUTION INTRAMUSCULAR; INTRAVENOUS
Status: COMPLETED | OUTPATIENT
Start: 2025-08-09 | End: 2025-08-09

## 2025-08-09 RX ADMIN — KETOROLAC TROMETHAMINE 30 MG: 30 INJECTION, SOLUTION INTRAMUSCULAR at 09:02

## 2025-08-09 ASSESSMENT — PAIN DESCRIPTION - ORIENTATION: ORIENTATION: LEFT

## 2025-08-09 ASSESSMENT — PAIN - FUNCTIONAL ASSESSMENT: PAIN_FUNCTIONAL_ASSESSMENT: 0-10

## 2025-08-09 ASSESSMENT — PAIN DESCRIPTION - LOCATION: LOCATION: HAND

## 2025-08-09 ASSESSMENT — ENCOUNTER SYMPTOMS: SHORTNESS OF BREATH: 0

## 2025-08-09 ASSESSMENT — PAIN SCALES - GENERAL: PAINLEVEL_OUTOF10: 8

## (undated) DEVICE — TOWEL SURG W17XL27IN STD BLU COT NONFENESTRATED PREWASHED

## (undated) DEVICE — (D)PREP SKN CHLRAPRP APPL 26ML -- CONVERT TO ITEM 371833

## (undated) DEVICE — SEAL

## (undated) DEVICE — NDL PRT INJ NSAF BLNT 18GX1.5 --

## (undated) DEVICE — SOLUTION IV 1000ML 0.9% SOD CHL

## (undated) DEVICE — TRI-LUMEN FILTERED TUBE SET WITH ACTIVATED CHARCOAL FILTER: Brand: AIRSEAL

## (undated) DEVICE — CANISTER, RIGID, 3000CC: Brand: MEDLINE INDUSTRIES, INC.

## (undated) DEVICE — NEEDLE HYPO 25GA L1.5IN BVL ORIENTED ECLIPSE

## (undated) DEVICE — 3M™ TEGADERM™ TRANSPARENT FILM DRESSING FRAME STYLE, 1626W, 4 IN X 4-3/4 IN (10 CM X 12 CM), 50/CT 4CT/CASE: Brand: 3M™ TEGADERM™

## (undated) DEVICE — SOLUTION IRRIGATION NACL 0.9% 1000 ML FLX CONTAINER

## (undated) DEVICE — REDUCER CANN ENDOWRIST 12-8MM -- DA VINCI XI - SNGL USE

## (undated) DEVICE — SUTURE ETHBND EXCEL SZ 0 L30IN NONABSORBABLE GRN L26MM CT-2 X412H

## (undated) DEVICE — AIRSEAL 5 MM ACCESS PORT AND LOW PROFILE OBTURATOR WITH BLADELESS OPTICAL TIP, 120 MM LENGTH: Brand: AIRSEAL

## (undated) DEVICE — STRAP,POSITIONING,KNEE/BODY,FOAM,4X60": Brand: MEDLINE

## (undated) DEVICE — DRAPE,REIN 53X77,STERILE: Brand: MEDLINE

## (undated) DEVICE — RESERVOIR,SUCTION,100CC,SILICONE: Brand: MEDLINE

## (undated) DEVICE — SET ADMIN 16ML TBNG L100IN 2 Y INJ SITE IV PIGGY BK DISP

## (undated) DEVICE — UNDERPAD INCON STD 36X23IN --

## (undated) DEVICE — COVER LT HNDL PLAS RIG 1 PER PK

## (undated) DEVICE — INFECTION CONTROL KIT SYS

## (undated) DEVICE — NDL FLTR TIP 5 MIC 18GX1.5IN --

## (undated) DEVICE — REM POLYHESIVE ADULT PATIENT RETURN ELECTRODE: Brand: VALLEYLAB

## (undated) DEVICE — CLICKLINE SCISSORS INSERT: Brand: CLICKLINE

## (undated) DEVICE — SUTURE VCRL SZ 2-0 L27IN ABSRB VLT L26MM SH 1/2 CIR J317H

## (undated) DEVICE — TROCAR SITE CLOSURE DEVICE: Brand: ENDO CLOSE

## (undated) DEVICE — VESSEL SEALER XI EXTENDED DISP -- VESSEL

## (undated) DEVICE — SOLIDIFIER MEDC 1200ML -- CONVERT TO 356117

## (undated) DEVICE — SPONGE GZ 4IN 4IN 4 PLY N WVN AVANT

## (undated) DEVICE — Device

## (undated) DEVICE — TIP COVER ACCESSORY

## (undated) DEVICE — DERMABOND SKIN ADH 0.7ML -- DERMABOND ADVANCED 12/BX

## (undated) DEVICE — DRAIN SURG 19FR 100% SIL RADPQ RND CHN FULL FLUT

## (undated) DEVICE — SYR 5ML 1/5 GRAD LL NSAF LF --

## (undated) DEVICE — SURGICAL PROCEDURE KIT GEN LAPAROSCOPY LF

## (undated) DEVICE — SUTURE MCRYL SZ 4-0 L27IN ABSRB UD L19MM PS-2 1/2 CIR PRIM Y426H

## (undated) DEVICE — BLADELESS OBTURATOR: Brand: WECK VISTA

## (undated) DEVICE — DRAIN PENR 0.25X18IN LTX STRL -- PENROSE LATEX

## (undated) DEVICE — CATH IV AUTOGRD BC PNK 20GA 30 -- INSYTE

## (undated) DEVICE — SEAL UNIV 5-8MM DISP BX/10 -- DA VINCI XI - SNGL USE

## (undated) DEVICE — VISUALIZATION SYSTEM: Brand: CLEARIFY

## (undated) DEVICE — SUTURE SZ 0 27IN 5/8 CIR UR-6  TAPER PT VIOLET ABSRB VICRYL J603H

## (undated) DEVICE — TOTAL TRAY, DB, 100% SILI FOLEY, 16FR 10: Brand: MEDLINE

## (undated) DEVICE — ARM DRAPE

## (undated) DEVICE — SUTURE VCRL SZ 2-0 L27IN ABSRB UD L26MM SH 1/2 CIR J417H

## (undated) DEVICE — STERILE POLYISOPRENE POWDER-FREE SURGICAL GLOVES: Brand: PROTEXIS

## (undated) DEVICE — KIT,1200CC CANISTER,3/16"X6' TUBING: Brand: MEDLINE INDUSTRIES, INC.

## (undated) DEVICE — PLEDGET SUT SFT OVL 3 8X5 16IN

## (undated) DEVICE — SYR 3ML LL TIP 1/10ML GRAD --

## (undated) DEVICE — JELLY,LUBE,STERILE,FLIP TOP,TUBE,4-OZ: Brand: MEDLINE